# Patient Record
Sex: FEMALE | Race: WHITE | NOT HISPANIC OR LATINO | Employment: OTHER | URBAN - METROPOLITAN AREA
[De-identification: names, ages, dates, MRNs, and addresses within clinical notes are randomized per-mention and may not be internally consistent; named-entity substitution may affect disease eponyms.]

---

## 2019-05-15 ENCOUNTER — OFFICE VISIT (OUTPATIENT)
Dept: URGENT CARE | Facility: CLINIC | Age: 49
End: 2019-05-15
Payer: MEDICARE

## 2019-05-15 VITALS
HEIGHT: 64 IN | RESPIRATION RATE: 18 BRPM | HEART RATE: 85 BPM | OXYGEN SATURATION: 97 % | DIASTOLIC BLOOD PRESSURE: 60 MMHG | SYSTOLIC BLOOD PRESSURE: 109 MMHG | BODY MASS INDEX: 22.88 KG/M2 | TEMPERATURE: 98.1 F | WEIGHT: 134 LBS

## 2019-05-15 DIAGNOSIS — J01.90 ACUTE SINUSITIS, RECURRENCE NOT SPECIFIED, UNSPECIFIED LOCATION: Primary | ICD-10-CM

## 2019-05-15 PROCEDURE — G0463 HOSPITAL OUTPT CLINIC VISIT: HCPCS | Performed by: PHYSICIAN ASSISTANT

## 2019-05-15 PROCEDURE — 99213 OFFICE O/P EST LOW 20 MIN: CPT | Performed by: PHYSICIAN ASSISTANT

## 2019-05-15 RX ORDER — METHYLPREDNISOLONE 4 MG/1
TABLET ORAL
Qty: 1 EACH | Refills: 0 | Status: ON HOLD | OUTPATIENT
Start: 2019-05-15 | End: 2021-05-28 | Stop reason: ALTCHOICE

## 2019-05-15 RX ORDER — OXYMETAZOLINE HYDROCHLORIDE 0.05 G/100ML
2 SPRAY NASAL 2 TIMES DAILY
Qty: 30 ML | Refills: 0 | Status: SHIPPED | OUTPATIENT
Start: 2019-05-15 | End: 2019-05-18

## 2021-05-28 ENCOUNTER — HOSPITAL ENCOUNTER (OUTPATIENT)
Facility: HOSPITAL | Age: 51
Setting detail: OBSERVATION
Discharge: HOME | End: 2021-05-28
Attending: EMERGENCY MEDICINE | Admitting: FAMILY MEDICINE
Payer: MEDICARE

## 2021-05-28 ENCOUNTER — APPOINTMENT (EMERGENCY)
Dept: RADIOLOGY | Facility: HOSPITAL | Age: 51
End: 2021-05-28
Payer: MEDICARE

## 2021-05-28 VITALS
OXYGEN SATURATION: 98 % | HEART RATE: 65 BPM | BODY MASS INDEX: 22.58 KG/M2 | RESPIRATION RATE: 18 BRPM | HEIGHT: 64 IN | DIASTOLIC BLOOD PRESSURE: 56 MMHG | TEMPERATURE: 97.6 F | WEIGHT: 132.28 LBS | SYSTOLIC BLOOD PRESSURE: 116 MMHG

## 2021-05-28 DIAGNOSIS — R07.9 CHEST PAIN: Primary | ICD-10-CM

## 2021-05-28 PROBLEM — H40.9 GLAUCOMA: Status: ACTIVE | Noted: 2021-05-28

## 2021-05-28 PROBLEM — F17.200 SMOKER: Status: ACTIVE | Noted: 2021-05-28

## 2021-05-28 PROBLEM — Z85.038 HISTORY OF COLON CANCER: Status: ACTIVE | Noted: 2021-05-28

## 2021-05-28 LAB
ALBUMIN SERPL BCP-MCNC: 4.2 G/DL (ref 3.5–5.7)
ALP SERPL-CCNC: 51 U/L (ref 40–150)
ALT SERPL W P-5'-P-CCNC: 7 U/L (ref 7–52)
ANION GAP SERPL CALCULATED.3IONS-SCNC: 6 MMOL/L (ref 4–13)
AST SERPL W P-5'-P-CCNC: 10 U/L (ref 13–39)
ATRIAL RATE: 64 BPM
ATRIAL RATE: 65 BPM
ATRIAL RATE: 76 BPM
ATRIAL RATE: 79 BPM
BASOPHILS # BLD AUTO: 0.1 THOUSANDS/ΜL (ref 0–0.1)
BASOPHILS NFR BLD AUTO: 1 % (ref 0–2)
BILIRUB SERPL-MCNC: 0.5 MG/DL (ref 0.2–1)
BUN SERPL-MCNC: 12 MG/DL (ref 7–25)
CALCIUM SERPL-MCNC: 8.8 MG/DL (ref 8.6–10.5)
CHLORIDE SERPL-SCNC: 104 MMOL/L (ref 98–107)
CO2 SERPL-SCNC: 26 MMOL/L (ref 21–31)
CREAT SERPL-MCNC: 0.66 MG/DL (ref 0.6–1.2)
D DIMER PPP FEU-MCNC: 0.3 UG/ML FEU
EOSINOPHIL # BLD AUTO: 0.2 THOUSAND/ΜL (ref 0–0.61)
EOSINOPHIL NFR BLD AUTO: 2 % (ref 0–5)
ERYTHROCYTE [DISTWIDTH] IN BLOOD BY AUTOMATED COUNT: 13.2 % (ref 11.5–14.5)
GFR SERPL CREATININE-BSD FRML MDRD: 103 ML/MIN/1.73SQ M
GLUCOSE SERPL-MCNC: 112 MG/DL (ref 65–99)
HCT VFR BLD AUTO: 39.4 % (ref 42–47)
HGB BLD-MCNC: 13.2 G/DL (ref 12–16)
INR PPP: 0.93 (ref 0.84–1.19)
LYMPHOCYTES # BLD AUTO: 1.6 THOUSANDS/ΜL (ref 0.6–4.47)
LYMPHOCYTES NFR BLD AUTO: 22 % (ref 21–51)
MCH RBC QN AUTO: 33.3 PG (ref 26–34)
MCHC RBC AUTO-ENTMCNC: 33.5 G/DL (ref 31–37)
MCV RBC AUTO: 100 FL (ref 81–99)
MONOCYTES # BLD AUTO: 0.4 THOUSAND/ΜL (ref 0.17–1.22)
MONOCYTES NFR BLD AUTO: 6 % (ref 2–12)
NEUTROPHILS # BLD AUTO: 5 THOUSANDS/ΜL (ref 1.4–6.5)
NEUTS SEG NFR BLD AUTO: 69 % (ref 42–75)
P AXIS: 56 DEGREES
P AXIS: 69 DEGREES
P AXIS: 71 DEGREES
P AXIS: 72 DEGREES
PLATELET # BLD AUTO: 304 THOUSANDS/UL (ref 149–390)
PMV BLD AUTO: 7.7 FL (ref 8.6–11.7)
POTASSIUM SERPL-SCNC: 4 MMOL/L (ref 3.5–5.5)
PR INTERVAL: 134 MS
PR INTERVAL: 136 MS
PR INTERVAL: 144 MS
PR INTERVAL: 150 MS
PROT SERPL-MCNC: 6.8 G/DL (ref 6.4–8.9)
PROTHROMBIN TIME: 12.4 SECONDS (ref 11.6–14.5)
QRS AXIS: 58 DEGREES
QRS AXIS: 71 DEGREES
QRS AXIS: 89 DEGREES
QRS AXIS: 89 DEGREES
QRSD INTERVAL: 80 MS
QRSD INTERVAL: 80 MS
QRSD INTERVAL: 82 MS
QRSD INTERVAL: 82 MS
QT INTERVAL: 394 MS
QT INTERVAL: 398 MS
QT INTERVAL: 428 MS
QT INTERVAL: 428 MS
QTC INTERVAL: 441 MS
QTC INTERVAL: 445 MS
QTC INTERVAL: 447 MS
QTC INTERVAL: 451 MS
RBC # BLD AUTO: 3.96 MILLION/UL (ref 3.9–5.2)
SARS-COV-2 RNA RESP QL NAA+PROBE: NEGATIVE
SODIUM SERPL-SCNC: 136 MMOL/L (ref 134–143)
T WAVE AXIS: 50 DEGREES
T WAVE AXIS: 50 DEGREES
T WAVE AXIS: 55 DEGREES
T WAVE AXIS: 58 DEGREES
TROPONIN I SERPL-MCNC: <0.03 NG/ML
VENTRICULAR RATE: 64 BPM
VENTRICULAR RATE: 65 BPM
VENTRICULAR RATE: 76 BPM
VENTRICULAR RATE: 79 BPM
WBC # BLD AUTO: 7.3 THOUSAND/UL (ref 4.8–10.8)

## 2021-05-28 PROCEDURE — 85379 FIBRIN DEGRADATION QUANT: CPT | Performed by: FAMILY MEDICINE

## 2021-05-28 PROCEDURE — 85025 COMPLETE CBC W/AUTO DIFF WBC: CPT | Performed by: EMERGENCY MEDICINE

## 2021-05-28 PROCEDURE — 71045 X-RAY EXAM CHEST 1 VIEW: CPT

## 2021-05-28 PROCEDURE — 80053 COMPREHEN METABOLIC PANEL: CPT | Performed by: EMERGENCY MEDICINE

## 2021-05-28 PROCEDURE — 93005 ELECTROCARDIOGRAM TRACING: CPT

## 2021-05-28 PROCEDURE — U0005 INFEC AGEN DETEC AMPLI PROBE: HCPCS | Performed by: PHYSICIAN ASSISTANT

## 2021-05-28 PROCEDURE — 36415 COLL VENOUS BLD VENIPUNCTURE: CPT | Performed by: EMERGENCY MEDICINE

## 2021-05-28 PROCEDURE — NC001 PR NO CHARGE: Performed by: FAMILY MEDICINE

## 2021-05-28 PROCEDURE — 84484 ASSAY OF TROPONIN QUANT: CPT | Performed by: FAMILY MEDICINE

## 2021-05-28 PROCEDURE — 93010 ELECTROCARDIOGRAM REPORT: CPT | Performed by: INTERNAL MEDICINE

## 2021-05-28 PROCEDURE — 85610 PROTHROMBIN TIME: CPT | Performed by: EMERGENCY MEDICINE

## 2021-05-28 PROCEDURE — 99285 EMERGENCY DEPT VISIT HI MDM: CPT

## 2021-05-28 PROCEDURE — 99285 EMERGENCY DEPT VISIT HI MDM: CPT | Performed by: PHYSICIAN ASSISTANT

## 2021-05-28 PROCEDURE — 84484 ASSAY OF TROPONIN QUANT: CPT | Performed by: EMERGENCY MEDICINE

## 2021-05-28 PROCEDURE — 99220 PR INITIAL OBSERVATION CARE/DAY 70 MINUTES: CPT | Performed by: FAMILY MEDICINE

## 2021-05-28 PROCEDURE — U0003 INFECTIOUS AGENT DETECTION BY NUCLEIC ACID (DNA OR RNA); SEVERE ACUTE RESPIRATORY SYNDROME CORONAVIRUS 2 (SARS-COV-2) (CORONAVIRUS DISEASE [COVID-19]), AMPLIFIED PROBE TECHNIQUE, MAKING USE OF HIGH THROUGHPUT TECHNOLOGIES AS DESCRIBED BY CMS-2020-01-R: HCPCS | Performed by: PHYSICIAN ASSISTANT

## 2021-05-28 RX ORDER — NITROGLYCERIN 0.4 MG/1
0.4 TABLET SUBLINGUAL ONCE
Status: DISCONTINUED | OUTPATIENT
Start: 2021-05-28 | End: 2021-05-28 | Stop reason: HOSPADM

## 2021-05-28 RX ORDER — ASPIRIN 81 MG/1
324 TABLET, CHEWABLE ORAL ONCE
Status: COMPLETED | OUTPATIENT
Start: 2021-05-28 | End: 2021-05-28

## 2021-05-28 RX ORDER — NICOTINE 21 MG/24HR
1 PATCH, TRANSDERMAL 24 HOURS TRANSDERMAL DAILY
Status: DISCONTINUED | OUTPATIENT
Start: 2021-05-28 | End: 2021-05-28 | Stop reason: HOSPADM

## 2021-05-28 RX ADMIN — ASPIRIN 324 MG: 81 TABLET, CHEWABLE ORAL at 09:24

## 2021-05-28 NOTE — PLAN OF CARE
Problem: PAIN - ADULT  Goal: Verbalizes/displays adequate comfort level or baseline comfort level  Description: Interventions:  - Encourage patient to monitor pain and request assistance  - Assess pain using appropriate pain scale  - Administer analgesics based on type and severity of pain and evaluate response  - Implement non-pharmacological measures as appropriate and evaluate response  - Consider cultural and social influences on pain and pain management  - Notify physician/advanced practitioner if interventions unsuccessful or patient reports new pain  Outcome: Progressing     Problem: INFECTION - ADULT  Goal: Absence or prevention of progression during hospitalization  Description: INTERVENTIONS:  - Assess and monitor for signs and symptoms of infection  - Monitor lab/diagnostic results  - Monitor all insertion sites, i e  indwelling lines, tubes, and drains  - Monitor endotracheal if appropriate and nasal secretions for changes in amount and color  - [place] appropriate cooling/warming therapies per order  - Administer medications as ordered  - Instruct and encourage patient and family to use good hand hygiene technique  - Identify and instruct in appropriate isolation precautions for identified infection/condition  Outcome: Progressing  Goal: Absence of fever/infection during neutropenic period  Description: INTERVENTIONS:  - Monitor WBC    Outcome: Progressing     Problem: SAFETY ADULT  Goal: Patient will remain free of falls  Description: INTERVENTIONS:  - Assess patient frequently for physical needs  -  Identify cognitive and physical deficits and behaviors that affect risk of falls    -  [place] fall precautions as indicated by assessment   - Educate patient/family on patient safety including physical limitations  - Instruct patient to call for assistance with activity based on assessment  - Modify environment to reduce risk of injury  - Consider OT/PT consult to assist with strengthening/mobility  Outcome: Progressing  Goal: Maintain or return to baseline ADL function  Description: INTERVENTIONS:  -  Assess patient's ability to carry out ADLs; assess patient's baseline for ADL function and identify physical deficits which impact ability to perform ADLs (bathing, care of mouth/teeth, toileting, grooming, dressing, etc )  - Assess/evaluate cause of self-care deficits   - Assess range of motion  - Assess patient's mobility; develop plan if impaired  - Assess patient's need for assistive devices and provide as appropriate  - Encourage maximum independence but intervene and supervise when necessary  - Involve family in performance of ADLs  - Assess for home care needs following discharge   - Consider OT consult to assist with ADL evaluation and planning for discharge  - Provide patient education as appropriate  Outcome: Progressing  Goal: Maintain or return mobility status to optimal level  Description: INTERVENTIONS:  - Assess patient's baseline mobility status (ambulation, transfers, stairs, etc )    - Identify cognitive and physical deficits and behaviors that affect mobility  - Identify mobility aids required to assist with transfers and/or ambulation (gait belt, sit-to-stand, lift, walker, cane, etc )  - [place] fall precautions as indicated by assessment  - Record patient progress and toleration of activity level on Mobility SBAR; progress patient to next Phase/Stage  - Instruct patient to call for assistance with activity based on assessment  - Consider rehabilitation consult to assist with strengthening/weightbearing, etc   Outcome: Progressing     Problem: DISCHARGE PLANNING  Goal: Discharge to home or other facility with appropriate resources  Description: INTERVENTIONS:  - Identify barriers to discharge w/patient and caregiver  - Arrange for needed discharge resources and transportation as appropriate  - Identify discharge learning needs (meds, wound care, etc )  - Arrange for interpretive services to assist at discharge as needed  - Refer to Case Management Department for coordinating discharge planning if the patient needs post-hospital services based on physician/advanced practitioner order or complex needs related to functional status, cognitive ability, or social support system  Outcome: Progressing     Problem: Knowledge Deficit  Goal: Patient/family/caregiver demonstrates understanding of disease process, treatment plan, medications, and discharge instructions  Description: Complete learning assessment and assess knowledge base    Interventions:  - Provide teaching at level of understanding  - Provide teaching via preferred learning methods  Outcome: Progressing

## 2021-05-28 NOTE — DISCHARGE INSTRUCTIONS
Cigarette Smoking and Your Health   AMBULATORY CARE:   Risks to your health if you smoke:  Nicotine and other chemicals found in tobacco and e-cigarettes can damage every cell in your body  Even if you are a light smoker, you have an increased risk for cancer, heart disease, and lung disease  If you are pregnant or have diabetes, smoking increases your risk for complications  Nicotine can affect an adolescent's developing brain  This can lead to trouble thinking, learning, or paying attention  Benefits to your health if you stop smoking:   · You decrease respiratory symptoms such as coughing, wheezing, and shortness of breath  · You reduce your risk for cancers of the lung, mouth, throat, kidney, bladder, pancreas, stomach, and cervix  If you already have cancer, you increase the benefits of chemotherapy  You also reduce your risk for cancer returning or a second cancer from developing  · You reduce your risk for heart disease, blood clots, heart attack, and stroke  · You reduce your risk for lung infections, and diseases such as pneumonia, asthma, chronic bronchitis, and emphysema  · Your circulation improves  More oxygen can be delivered to your body  If you have diabetes, you lower your risk for complications, such as kidney, artery, and eye diseases  You also lower your risk for nerve damage  Nerve damage can lead to amputations, poor vision, and blindness  · You improve your body's ability to heal and to fight infections  · An adolescent can help his or her brain and body develop in a healthy way  Talk to your adolescent about all the health risks of nicotine  If you can, start talking about nicotine when your child is younger than 12 years  This may make it easier for him or her not to start using nicotine as a teenager or adult  Explain to him or her that it is best never to start  It can be hard to try to quit later      Benefits to the health of others if you stop smoking:  Tobacco is harmful to nonsmokers who breathe in your secondhand smoke  The following are ways the health of others around you may improve when you stop smoking:  · You lower the risks for lung cancer and heart disease in nonsmoking adults  · If you are pregnant, you lower the risk for miscarriage, early delivery, low birth weight, and stillbirth  You also lower your baby's risk for SIDS, obesity, developmental delay, and neurobehavioral problems, such as ADHD  · If you have children, you lower their risk for ear infections, colds, pneumonia, bronchitis, and asthma  Follow up with your doctor as directed:  Write down your questions so you remember to ask them during your visits  For more information and support to stop smoking:   · [employer]  Phone: 1- 250 - 414-2458  Web Address: [employer]  [place] [address] Information is for End User's use only and may not be sold, redistributed or otherwise used for commercial purposes  All illustrations and images included in CareNotes® are the copyrighted property of A D A M , Inc  or [address] [place] [address]  The above information is an  only  It is not intended as medical advice for individual conditions or treatments  Talk to your doctor, nurse or pharmacist before following any medical regimen to see if it is safe and effective for you  How to Stop Smoking, Ambulatory Care   GENERAL INFORMATION:   Why you should stop smoking: You will improve your health and the health of others around you if you stop smoking  Your risk of heart and lung disease, cancer, stroke, heart attack, and vision problems will also decrease  You can benefit from quitting no matter how long you have smoked  Quitting may even prolong your life  Prepare to stop smoking:  Nicotine is a highly addictive drug found in cigarettes  Withdrawal symptoms can happen when you stop smoking and make it hard to quit   These include anxiety, depression, irritability, trouble sleeping, and increased appetite  You increase your chances of success if you prepare to quit  · Set a quit date  This will help confirm your decision to stop smoking  · Tell friends and family that you plan to quit  Explain that you may have withdrawal symptoms when you try to quit  Ask them to support you  They may be able to encourage you and help reduce your stress to make it easier for you to quit  · Expect it to be hard to quit, but know you can do it  Smoking is a daily habit that becomes part of your life  Know the triggers that tempt you to smoke, so you can break this habit  Write down a list of these challenges and have a plan to avoid them  · Remove all tobacco and nicotine products from your home, car, and workplace  Also, remove anything else that will tempt you to smoke, such as lighters, matches, or ramon trays  Tools to help you stop smoking: You may be able to quit on your own, or you may need to try one or more of the following:  · Counseling  from trained caregivers will help teach you skills to quit smoking  They will also teach you to manage your withdrawal symptoms and cravings  You may receive counseling from one counselor, in group therapy, or through phone therapy called a quit line  · Nicotine replacement therapy (NRT)  such as nicotine patches, gum, or lozenges may help reduce your nicotine cravings and other withdrawal symptoms  You may get these without a doctor's order  · Prescription medicines  such as nasal sprays or nicotine inhalers may help reduce your withdrawal symptoms  Other medicines may also be used to reduce your urge to smoke  Ask your primary healthcare provider about these medicines  You may need to start certain medicines 2 weeks before your quit date for them to work well  Manage your cravings:   · Avoid situations, people, and places that tempt you to smoke  Go to nonsmoking places, such as libraries or restaurants   Understand what tempts you and try to avoid these things  · Keep your hands busy  Hold things such as a stress ball or pen  Keep lollipops, gum, or toothpicks in your mouth to distract you from your cravings  · Avoid alcohol and caffeine  These drinks may tempt you to smoke  Drink healthy liquids such as water or juice instead  · Reward yourself when you resist your cravings  Rewards will motivate you and help you stay positive  For more support and information:   · [employer]  Phone: 4- 299 - 358-8971  Web Address: www smokefree  [employer]  CARE AGREEMENT:   You have the right to help plan your care  Learn about your health condition and how it may be treated  Discuss treatment options with your caregivers to decide what care you want to receive  You always have the right to refuse treatment  The above information is an  only  It is not intended as medical advice for individual conditions or treatments  Talk to your doctor, nurse or pharmacist before following any medical regimen to see if it is safe and effective for you  © 2014 [address] is for End User's use only and may not be sold, redistributed or otherwise used for commercial purposes  All illustrations and images included in CareNotes® are the copyrighted property of [employer]  or [employer]

## 2021-05-28 NOTE — ASSESSMENT & PLAN NOTE
Will admit for ACS rule out  Initial troponin negative  EKG:  Normal sinus rhythm, nonischemic  Will trend troponin and do serial EKGs  Will place in telemetry  Consider cardiology consult depending on results

## 2021-05-28 NOTE — DISCHARGE SUMMARY
[facility]  Discharge- Angélica Pinzon 1970, 48 y o  female MRN: [id number]  Unit/Bed#: -02 Encounter: [id number]  Primary Care Provider: Flower Shepherd MD   Date and time admitted to hospital: 5/28/2021  9:05 AM    * Chest pain  Assessment & Plan  ACS ruled out  Troponin 3 times negative  EKG nonischemic    Patient can be discharged home  Advised to follow-up with PCP for stress test    History of colon cancer  Assessment & Plan  In remission  Continue to follow-up oncology as outpatient    Glaucoma  Assessment & Plan  Continue [place]    Smoker  Assessment & Plan  Smoking cessation Education provided        Discharging Physician / Practitioner: Bret Mcneil MD  PCP: Flower Shepherd MD  Admission Date:   Admission Orders (From admission, onward)     Ordered        05/28/21 1024  Place in Observation  Once                   Discharge Date: 05/28/21        Consultations During Hospital Stay:  · None    Procedures Performed:   · None    Significant Findings / Test Results:   · None    Incidental Findings:   · None    Test Results Pending at Discharge (will require follow up): · None     Outpatient Tests Requested:  · Advised to follow-up with PCP for stress test    Complications:  None    Reason for Admission:  Chest pain  See HPI for details  Hospital Course:     Angélica Pinzon is a 48 y o  female patient who originally presented to the hospital on 5/28/2021 due to chest pain  Telemetry unremarkable  Troponin 3 times negative  EKG nonischemic   ACS ruled out  Patient is stable to be discharged home today  She was advised to follow-up with PCP for stress test      Please see above list of diagnoses and related plan for additional information  Condition at Discharge: good     Discharge Day Visit / Exam:     Subjective:  Patient seen examined bedside  No acute events    No chest pain  Vitals: Blood Pressure: 116/56 (05/28/21 1500)  Pulse: 65 (05/28/21 1500)  Temperature: 97 6 °F (36 4 °C) (05/28/21 1500)  Temp Source: Temporal (05/28/21 1500)  Respirations: 18 (05/28/21 1500)  Height: 5' 4" (162 6 cm) (05/28/21 1130)  Weight - Scale: 60 kg (132 lb 4 4 oz) (05/28/21 1130)  SpO2: 98 % (05/28/21 1500)  Exam:   Physical Exam  Vitals signs and nursing note reviewed  Constitutional:       General: She is not in acute distress  HENT:      Head: Normocephalic  Nose: Nose normal       Mouth/Throat:      Pharynx: Oropharynx is clear  Eyes:      Conjunctiva/sclera: Conjunctivae normal    Cardiovascular:      Rate and Rhythm: Normal rate and regular rhythm  Heart sounds: No murmur  Pulmonary:      Effort: Pulmonary effort is normal  No respiratory distress  Breath sounds: Normal breath sounds  No wheezing  Abdominal:      General: There is no distension  Tenderness: There is no abdominal tenderness  There is no guarding  Musculoskeletal: Normal range of motion  General: No swelling  Right lower leg: No edema  Skin:     General: Skin is warm  Capillary Refill: Capillary refill takes less than 2 seconds  Neurological:      General: No focal deficit present  Mental Status: She is alert and oriented to person, place, and time  Cranial Nerves: No cranial nerve deficit  Psychiatric:         Mood and Affect: Mood normal          Discussion with Family:  With patient    Discharge instructions/Information to patient and family:   See after visit summary for information provided to patient and family  Provisions for Follow-Up Care:  See after visit summary for information related to follow-up care and any pertinent home health orders  Disposition:     Home    For Discharges to [facility] SNF:   · Not Applicable to this Patient - Not Applicable to this Patient    Planned Readmission:  No     Discharge Statement:  I spent 35 minutes discharging the patient  This time was spent on the day of discharge  I had direct contact with the patient on the day of discharge  Greater than 50% of the total time was spent examining patient, answering all patient questions, arranging and discussing plan of care with patient as well as directly providing post-discharge instructions  Additional time then spent on discharge activities  Discharge Medications:  See after visit summary for reconciled discharge medications provided to patient and family        ** Please Note: This note has been constructed using a voice recognition system **

## 2021-05-28 NOTE — NURSING NOTE
Vs by Harish Cisneros , Discharged, discharge instructions given and verbalizes understanding of same

## 2021-05-28 NOTE — DISCHARGE INSTR - AVS FIRST PAGE
Dear Irasema Alejandre,     It was our pleasure to care for you here at [facility]  It is our hope that we were always able to exceed the expected standards for your care during your stay  You were hospitalized due to chest pain  You were cared for on the medical floor by Rhonda Moran MD with the [facility] who covers for your primary care physician (PCP), Jw Warren MD, while you were hospitalized  If you have any questions or concerns related to this hospitalization, you may contact us at [phone]  For follow up as well as any medication refills, we recommend that you follow up with your primary care physician  A registered nurse will reach out to you by phone within a few days after your discharge to answer any additional questions that you may have after going home  However, at this time we provide for you here, the most important instructions / recommendations at discharge:     · Notable Medication Adjustments -   · None  · Testing Required after Discharge -   · Follow-up with PCP for stress test  · Important follow up information -   · None  · Other Instructions -   · Non  · Please review this entire after visit summary as additional general instructions including medication list, appointments, activity, diet, any pertinent wound care, and other additional recommendations from your care team that may be provided for you        Sincerely,     Rhonda Moran MD

## 2021-05-28 NOTE — ED PROVIDER NOTES
History  Chief Complaint   Patient presents with    Chest Pain     left sided chest pain radiating into left arm  began around 30 minutes ago  occured álvaro she was walking around [facility]     51-year-old female 1 pack per day smoker and recently diagnosed glaucoma presents complaining of chest pain  Patient describes it as a pressure-like sensation with radiation to the left arm that was associated with clamminess, dizziness and generalized weakness with the pain occurred  She reports that the pain has been intermittent since it occurred starting approximately 30 minutes prior to arrival although a slight pain is always present  She reports a significant family history of both her mother and father having multiple heart attacks in their late 45s and early 46s  She denies a history of hypertension, hyperlipidemia or being a diabetic  Denies any pleuritic pain or shortness of breath or any recent lower leg pain or swelling or any other complaints at this time  Has not taken anything for her symptoms and denies any alleviating or exacerbating factors  Prior to Admission Medications   Prescriptions Last Dose Informant Patient Reported? Taking?   bimatoprost (LUMIGAN) 0 01 % ophthalmic drops   Yes Yes   Si drop daily at bedtime   methylPREDNISolone 4 MG tablet therapy pack Not Taking at Unknown time  No No   Sig: Use as directed on package   Patient not taking: Reported on 2021   oxymetazoline (AFRIN) 0 05 % nasal spray   No No   Si sprays by Each Nare route 2 (two) times a day for 3 days      Facility-Administered Medications: None       Past Medical History:   Diagnosis Date    Glaucoma        Past Surgical History:   Procedure Laterality Date    LIVER SURGERY      RIGHT COLON RESECTION         History reviewed  No pertinent family history  I have reviewed and agree with the history as documented      E-Cigarette/Vaping    E-Cigarette Use Never User      E-Cigarette/Vaping Substances Social History     Tobacco Use    Smoking status: Current Every Day Smoker     Packs/day: 1 00    Smokeless tobacco: Never Used   Substance Use Topics    Alcohol use: Not Currently    Drug use: Never       Review of Systems   Constitutional: Positive for diaphoresis  Negative for chills, fatigue and fever  HENT: Negative for ear pain and sore throat  Eyes: Negative for pain  Respiratory: Negative for cough, shortness of breath and wheezing  Cardiovascular: Positive for chest pain  Negative for palpitations and leg swelling  Gastrointestinal: Positive for nausea  Negative for abdominal pain, constipation, diarrhea and vomiting  Endocrine: Negative for polyuria  Genitourinary: Negative for dysuria and pelvic pain  Musculoskeletal: Negative for arthralgias, myalgias, neck pain and neck stiffness  Skin: Negative for rash  Neurological: Negative for dizziness, syncope, light-headedness and headaches  All other systems reviewed and are negative  Physical Exam  Physical Exam  Constitutional:       Appearance: She is well-developed  HENT:      Head: Normocephalic and atraumatic  Mouth/Throat:      Pharynx: No oropharyngeal exudate  Neck:      Musculoskeletal: Normal range of motion  Cardiovascular:      Rate and Rhythm: Normal rate and regular rhythm  Heart sounds: Normal heart sounds  Pulmonary:      Effort: Pulmonary effort is normal       Breath sounds: Normal breath sounds  Abdominal:      General: Bowel sounds are normal       Palpations: Abdomen is soft  Tenderness: There is no abdominal tenderness  Musculoskeletal: Normal range of motion  Skin:     General: Skin is warm  Capillary Refill: Capillary refill takes less than 2 seconds  Neurological:      General: No focal deficit present  Mental Status: She is alert and oriented to person, place, and time           Vital Signs  ED Triage Vitals [05/28/21 0855]   Temperature Pulse Respirations Blood Pressure SpO2   (!) 97 2 °F (36 2 °C) 76 18 113/61 98 %      Temp Source Heart Rate Source Patient Position - Orthostatic VS BP Location FiO2 (%)   Temporal -- -- -- --      Pain Score       5           Vitals:    05/28/21 0855   BP: 113/61   Pulse: 76         Visual Acuity      ED Medications  Medications   nitroglycerin (NITROSTAT) SL tablet 0 4 mg (has no administration in time range)   aspirin chewable tablet 324 mg (324 mg Oral Given 5/28/21 0924)       Diagnostic Studies  Results Reviewed     Procedure Component Value Units Date/Time    Troponin I repeat in 3 hrs [[id number]]     Lab Status: No result Specimen: Blood     Novel Coronavirus (Covid-19),PCR [facility] - 2 Hour Stat [[id number]] Collected: 05/28/21 0944    Lab Status:  In process Specimen: Nasopharyngeal Swab Updated: 05/28/21 0948    Comprehensive metabolic panel [[id number]]  (Abnormal) Collected: 05/28/21 0911    Lab Status: Final result Specimen: Blood from Arm, Left Updated: 05/28/21 0940     Sodium 136 mmol/L      Potassium 4 0 mmol/L      Chloride 104 mmol/L      CO2 26 mmol/L      ANION GAP 6 mmol/L      BUN 12 mg/dL      Creatinine 0 66 mg/dL      Glucose 112 mg/dL      Calcium 8 8 mg/dL      AST 10 U/L      ALT 7 U/L      Alkaline Phosphatase 51 U/L      Total Protein 6 8 g/dL      Albumin 4 2 g/dL      Total Bilirubin 0 50 mg/dL      eGFR 103 ml/min/1 73sq m     Narrative:      Nicho guidelines for Chronic Kidney Disease (CKD):     Stage 1 with normal or high GFR (GFR > 90 mL/min/1 73 square meters)    Stage 2 Mild CKD (GFR = 60-89 mL/min/1 73 square meters)    Stage 3A Moderate CKD (GFR = 45-59 mL/min/1 73 square meters)    Stage 3B Moderate CKD (GFR = 30-44 mL/min/1 73 square meters)    Stage 4 Severe CKD (GFR = 15-29 mL/min/1 73 square meters)    Stage 5 End Stage CKD (GFR <15 mL/min/1 73 square meters)  Note: GFR calculation is accurate only with a steady state creatinine    Troponin I [[id number]] (Normal) Collected: 05/28/21 0911    Lab Status: Final result Specimen: Blood from Arm, Left Updated: 05/28/21 0940     Troponin I <0 03 ng/mL     Protime-INR [[id number]]  (Normal) Collected: 05/28/21 0911    Lab Status: Final result Specimen: Blood from Arm, Left Updated: 05/28/21 0936     Protime 12 4 seconds      INR 0 93    CBC and differential [[id number]]  (Abnormal) Collected: 05/28/21 0911    Lab Status: Final result Specimen: Blood from Arm, Left Updated: 05/28/21 0920     WBC 7 30 Thousand/uL      RBC 3 96 Million/uL      Hemoglobin 13 2 g/dL      Hematocrit 39 4 %       fL      MCH 33 3 pg      MCHC 33 5 g/dL      RDW 13 2 %      MPV 7 7 fL      Platelets 107 Thousands/uL      Neutrophils Relative 69 %      Lymphocytes Relative 22 %      Monocytes Relative 6 %      Eosinophils Relative 2 %      Basophils Relative 1 %      Neutrophils Absolute 5 00 Thousands/µL      Lymphocytes Absolute 1 60 Thousands/µL      Monocytes Absolute 0 40 Thousand/µL      Eosinophils Absolute 0 20 Thousand/µL      Basophils Absolute 0 10 Thousands/µL                  XR chest 1 view portable   Final Result by Becca Pool DO (05/28 1008)      No acute cardiopulmonary disease  Workstation performed: [id number]                    Procedures  ECG 12 Lead Documentation Only    Date/Time: 5/28/2021 9:23 AM  Performed by: Beto Cortes PA-C  Authorized by:  Beto Cortes PA-C     ECG reviewed by me, the ED Provider: yes    Patient location:  ED  Previous ECG:     Previous ECG:  Compared to current    Similarity:  No change    Comparison to cardiac monitor: Yes    Interpretation:     Interpretation: normal    Rate:     ECG rate:  79    ECG rate assessment: normal    Rhythm:     Rhythm: sinus rhythm    Ectopy:     Ectopy: none    QRS:     QRS axis:  Normal  Conduction:     Conduction: normal    ST segments:     ST segments:  Normal  T waves:     T waves: normal    Comments:      No evidence of acute cardiac ischemia             ED Course             HEART Risk Score      Most Recent Value   Heart Score Risk Calculator   History  2 Filed at: 05/28/2021 0919   ECG  0 Filed at: 05/28/2021 0919   Age  1 Filed at: 05/28/2021 0919   Risk Factors  2 Filed at: 05/28/2021 0919   Troponin  0 Filed at: 05/28/2021 0919   HEART Score  5 Filed at: 05/28/2021 0866                                    MDM  Number of Diagnoses or Management Options  Chest pain:   Diagnosis management comments: 77-year-old female presented complaining of chest pain that was concerning for true cardiac chest pain clammy S, weakness and dizziness with radiation to left arm  Initial troponin negative  EKG shows no signs of acute cardiac ischemia however given heart score 5 will admit for chest pain observation  Patient is agreeable to plan  Disposition  Final diagnoses:   Chest pain     Time reflects when diagnosis was documented in both MDM as applicable and the Disposition within this note     Time User Action Codes Description Comment    5/28/2021 10:24 AM Kecia Gaxiola Add [R07 9] Chest pain       ED Disposition     ED Disposition Condition Date/Time Comment    Admit Stable Fri May 28, 2021 10:24 AM Case was discussed with Dr Zuleima Blanchard and the patient's admission status was agreed to be Admission Status: observation status to the service of Dr Zuleima Blanchard  Follow-up Information    None         Patient's Medications   Discharge Prescriptions    No medications on file     No discharge procedures on file      PDMP Review     None          ED Provider  Electronically Signed by           Rusty Griffiths PA-C  05/28/21 0468

## 2021-05-28 NOTE — H&P
[facility]  H&P- Yuriy Valdez 1970, 48 y o  female MRN: [id number]  Unit/Bed#: -02 Encounter: [id number]  Primary Care Provider: Mal Harding MD   Date and time admitted to hospital: 5/28/2021  9:05 AM    * Chest pain  Assessment & Plan  Will admit for ACS rule out  Initial troponin negative  EKG:  Normal sinus rhythm, nonischemic  Will trend troponin and do serial EKGs  Will place in telemetry  Consider cardiology consult depending on results      History of colon cancer  Assessment & Plan  In remission  Continue to follow-up oncology as outpatient    Glaucoma  Assessment & Plan  Continue Lumigan    Smoker  Assessment & Plan  Will provide with nicotine patch  Smoking cessation Education prior discharge    VTE Prophylaxis: Patient ambulatory  / sequential compression device   Code Status:  Full code  POLST: POLST form is not discussed and not completed at this time  Discussion with family:  With patient    Anticipated Length of Stay:  Patient will be admitted on an Observation basis with an anticipated length of stay of  less than 2 midnights  Justification for Hospital Stay:  ACS rule out    Total Time for Visit, including Counseling / Coordination of Care: 45 minutes  Greater than 50% of this total time spent on direct patient counseling and coordination of care  Chief Complaint:   Chest pain    History of Present Illness:    Yuriy Valdez is a 48 y o  female with past medical history significant for colon cancer,  now in remission who presents with chest pain  Patient describes sudden onset substernal, 5/5 chest pain that radiates across the chest to the left arm  It is associated with dizziness and shortness of breath  Nothing makes it better or worse     Patient never had pain like that before  Her father had triple bypass at the age of 55      She continues to smoke 1 pack of cigarettes daily  Review of Systems:    Review of Systems   Constitutional: Negative for chills, fever and unexpected weight change  HENT: Negative for hearing loss, nosebleeds and sore throat  Eyes: Negative for pain, redness and visual disturbance  Respiratory: Positive for shortness of breath  Negative for cough and wheezing  Cardiovascular: Positive for chest pain  Negative for palpitations and leg swelling  Gastrointestinal: Negative for abdominal pain, nausea and vomiting  Endocrine: Negative for polydipsia and polyuria  Genitourinary: Negative for dysuria and hematuria  Musculoskeletal: Negative for arthralgias, joint swelling and myalgias  Skin: Negative for rash and wound  Neurological: Positive for dizziness  Negative for numbness and headaches  Psychiatric/Behavioral: Negative for decreased concentration and suicidal ideas  The patient is not nervous/anxious  Past Medical and Surgical History:     Past Medical History:   Diagnosis Date    Glaucoma        Past Surgical History:   Procedure Laterality Date    LIVER SURGERY      RIGHT COLON RESECTION         Meds/Allergies:    Prior to Admission medications    Medication Sig Start Date End Date Taking? Authorizing Provider   bimatoprost (LUMIGAN) 0 01 % ophthalmic drops 1 drop daily at bedtime   Yes Historical Provider, MD   methylPREDNISolone 4 MG tablet therapy pack Use as directed on package  Patient not taking: Reported on 5/28/2021 5/15/19   Omayra Aquino PA-C   oxymetazoline (AFRIN) 0 05 % nasal spray 2 sprays by Each Nare route 2 (two) times a day for 3 days 5/15/19 5/18/19  Marylee Dotter, PA-C     I have reviewed home medications with patient personally  Allergies:    Allergies   Allergen Reactions    Clarithromycin      Other reaction(s): GI Upset    Iodinated Diagnostic Agents     Morphine      Other reaction(s): GI Upset       Social History:     Marital Status: /Civil Union   Occupation:   Patient Pre-hospital Living Situation:  Living with   Patient Pre-hospital Level of Mobility:  Normal  Patient Pre-hospital Diet Restrictions:  None  Substance Use History:   Social History     Substance and Sexual Activity   Alcohol Use Not Currently     Social History     Tobacco Use   Smoking Status Current Every Day Smoker    Packs/day: 1 00   Smokeless Tobacco Never Used     Social History     Substance and Sexual Activity   Drug Use Never       Family History:    non-contributory    Physical Exam:     Vitals:   Blood Pressure: 95/54 (05/28/21 1100)  Pulse: 59 (05/28/21 1100)  Temperature: (!) 97 2 °F (36 2 °C) (05/28/21 0855)  Temp Source: Temporal (05/28/21 0855)  Respirations: 17 (05/28/21 1100)  Height: 5' 4" (162 6 cm) (05/28/21 0855)  Weight - Scale: 59 kg (130 lb) (05/28/21 0855)  SpO2: 98 % (05/28/21 1100)    Physical Exam        Additional Data:     Lab Results: I have personally reviewed pertinent reports  Results from last 7 days   Lab Units 05/28/21  0911   WBC Thousand/uL 7 30   HEMOGLOBIN g/dL 13 2   HEMATOCRIT % 39 4*   PLATELETS Thousands/uL 304   NEUTROS PCT % 69   LYMPHS PCT % 22   MONOS PCT % 6   EOS PCT % 2     Results from last 7 days   Lab Units 05/28/21  0911   SODIUM mmol/L 136   POTASSIUM mmol/L 4 0   CHLORIDE mmol/L 104   CO2 mmol/L 26   BUN mg/dL 12   CREATININE mg/dL 0 66   ANION GAP mmol/L 6   CALCIUM mg/dL 8 8   ALBUMIN g/dL 4 2   TOTAL BILIRUBIN mg/dL 0 50   ALK PHOS U/L 51   ALT U/L 7   AST U/L 10*   GLUCOSE RANDOM mg/dL 112*     Results from last 7 days   Lab Units 05/28/21  0911   INR  0 93                   Imaging: I have personally reviewed pertinent reports  XR chest 1 view portable   Final Result by Reginaldo Wade DO (05/28 1008)      No acute cardiopulmonary disease                    Workstation performed: [id number]             EKG, Pathology, and Other Studies Reviewed on Admission:   · EKG:  Sinus rhythm, nonischemic    Allscripts / Epic Records Reviewed: Yes     ** Please Note: This note has been constructed using a voice recognition system   **

## 2021-05-28 NOTE — ASSESSMENT & PLAN NOTE
ACS ruled out  Troponin 3 times negative  EKG nonischemic    Patient can be discharged home    Advised to follow-up with PCP for stress test

## 2023-02-24 ENCOUNTER — APPOINTMENT (EMERGENCY)
Dept: CT IMAGING | Facility: HOSPITAL | Age: 53
End: 2023-02-24

## 2023-02-24 ENCOUNTER — HOSPITAL ENCOUNTER (EMERGENCY)
Facility: HOSPITAL | Age: 53
Discharge: HOME | End: 2023-02-24
Attending: EMERGENCY MEDICINE

## 2023-02-24 VITALS
OXYGEN SATURATION: 98 % | DIASTOLIC BLOOD PRESSURE: 71 MMHG | TEMPERATURE: 98.1 F | RESPIRATION RATE: 18 BRPM | HEART RATE: 75 BPM | SYSTOLIC BLOOD PRESSURE: 141 MMHG

## 2023-02-24 DIAGNOSIS — R20.2 PARESTHESIAS: Primary | ICD-10-CM

## 2023-02-24 DIAGNOSIS — R42 POSTURAL DIZZINESS WITH PRESYNCOPE: ICD-10-CM

## 2023-02-24 DIAGNOSIS — R55 POSTURAL DIZZINESS WITH PRESYNCOPE: ICD-10-CM

## 2023-02-24 LAB
ALBUMIN SERPL BCP-MCNC: 4.2 G/DL (ref 3.5–5)
ALP SERPL-CCNC: 61 U/L (ref 34–104)
ALT SERPL W P-5'-P-CCNC: 14 U/L (ref 7–52)
ANION GAP SERPL CALCULATED.3IONS-SCNC: 7 MMOL/L (ref 4–13)
AST SERPL W P-5'-P-CCNC: 18 U/L (ref 13–39)
BASOPHILS # BLD AUTO: 0.06 THOUSANDS/ÂΜL (ref 0–0.1)
BASOPHILS NFR BLD AUTO: 1 % (ref 0–1)
BILIRUB SERPL-MCNC: 0.54 MG/DL (ref 0.2–1)
BUN SERPL-MCNC: 16 MG/DL (ref 5–25)
CALCIUM SERPL-MCNC: 9.6 MG/DL (ref 8.4–10.2)
CARDIAC TROPONIN I PNL SERPL HS: <2 NG/L
CHLORIDE SERPL-SCNC: 105 MMOL/L (ref 96–108)
CO2 SERPL-SCNC: 25 MMOL/L (ref 21–32)
CREAT SERPL-MCNC: 0.7 MG/DL (ref 0.6–1.3)
EOSINOPHIL # BLD AUTO: 0.39 THOUSAND/ÂΜL (ref 0–0.61)
EOSINOPHIL NFR BLD AUTO: 4 % (ref 0–6)
ERYTHROCYTE [DISTWIDTH] IN BLOOD BY AUTOMATED COUNT: 12.4 % (ref 11.6–15.1)
GFR SERPL CREATININE-BSD FRML MDRD: 99 ML/MIN/1.73SQ M
GLUCOSE SERPL-MCNC: 83 MG/DL (ref 65–140)
HCT VFR BLD AUTO: 38.6 % (ref 34.8–46.1)
HGB BLD-MCNC: 13.2 G/DL (ref 11.5–15.4)
IMM GRANULOCYTES # BLD AUTO: 0.03 THOUSAND/UL (ref 0–0.2)
IMM GRANULOCYTES NFR BLD AUTO: 0 % (ref 0–2)
LYMPHOCYTES # BLD AUTO: 2.62 THOUSANDS/ÂΜL (ref 0.6–4.47)
LYMPHOCYTES NFR BLD AUTO: 23 % (ref 14–44)
MCH RBC QN AUTO: 34.8 PG (ref 26.8–34.3)
MCHC RBC AUTO-ENTMCNC: 34.2 G/DL (ref 31.4–37.4)
MCV RBC AUTO: 102 FL (ref 82–98)
MONOCYTES # BLD AUTO: 0.73 THOUSAND/ÂΜL (ref 0.17–1.22)
MONOCYTES NFR BLD AUTO: 7 % (ref 4–12)
NEUTROPHILS # BLD AUTO: 7.45 THOUSANDS/ÂΜL (ref 1.85–7.62)
NEUTS SEG NFR BLD AUTO: 65 % (ref 43–75)
NRBC BLD AUTO-RTO: 0 /100 WBCS
PLATELET # BLD AUTO: 294 THOUSANDS/UL (ref 149–390)
PMV BLD AUTO: 9.4 FL (ref 8.9–12.7)
POTASSIUM SERPL-SCNC: 4.7 MMOL/L (ref 3.5–5.3)
PROT SERPL-MCNC: 6.7 G/DL (ref 6.4–8.4)
RBC # BLD AUTO: 3.79 MILLION/UL (ref 3.81–5.12)
SODIUM SERPL-SCNC: 137 MMOL/L (ref 135–147)
TSH SERPL DL<=0.05 MIU/L-ACNC: 3.83 UIU/ML (ref 0.45–4.5)
WBC # BLD AUTO: 11.28 THOUSAND/UL (ref 4.31–10.16)

## 2023-02-24 RX ADMIN — SODIUM CHLORIDE 1000 ML: 0.9 INJECTION, SOLUTION INTRAVENOUS at 18:42

## 2023-02-24 NOTE — ED NOTES
Patient transported to [facility] 809, [address] [facility]  02/24/23 9649

## 2023-02-25 LAB
ATRIAL RATE: 60 BPM
P AXIS: 61 DEGREES
PR INTERVAL: 160 MS
QRS AXIS: 88 DEGREES
QRSD INTERVAL: 80 MS
QT INTERVAL: 438 MS
QTC INTERVAL: 438 MS
T WAVE AXIS: 64 DEGREES
VENTRICULAR RATE: 60 BPM

## 2023-02-25 NOTE — ED PROVIDER NOTES
History  Chief Complaint   Patient presents with   • Tingling     Patient reports tingling in multiple areas of her body for the past 6 days  Patient reports it goes on and off  Patient also reports headache  Patient reports sinus infection that she has had since September  This patient complains of persistent fluctuating headache for the past couple weeks which she has been seeing ENT has tried multiple medications for sinusitis  She reports that today she also had paresthesias in all of her limbs and some presyncope  She states she has a history of panic attacks but thinks that this is different  States she is not having any focal weakness or numbness, just a diffuse paresthesia with some global presyncope and weakness  Not having any chest pain, no palpitations, no cough, no syncope  She reports urination and bowel and admits have been normal           Prior to Admission Medications   Prescriptions Last Dose Informant Patient Reported? Taking?   bimatoprost (LUMIGAN) 0 01 % ophthalmic drops   Yes No   Si drop daily at bedtime   oxymetazoline (AFRIN) 0 05 % nasal spray   No No   Si sprays by Each Nare route 2 (two) times a day for 3 days      Facility-Administered Medications: None       Past Medical History:   Diagnosis Date   • Glaucoma        Past Surgical History:   Procedure Laterality Date   • LIVER SURGERY     • RIGHT COLON RESECTION         History reviewed  No pertinent family history  I have reviewed and agree with the history as documented  E-Cigarette/Vaping   • E-Cigarette Use Never User      E-Cigarette/Vaping Substances     Social History     Tobacco Use   • Smoking status: Every Day     Packs/day: 1 00     Types: Cigarettes   • Smokeless tobacco: Never   Vaping Use   • Vaping Use: Never used   Substance Use Topics   • Alcohol use: Not Currently   • Drug use: Never       Review of Systems   Constitutional: Negative for activity change, chills, fatigue and fever     HENT: Positive for congestion and sore throat  Eyes: Negative for visual disturbance  Respiratory: Negative for cough, chest tightness and shortness of breath  Cardiovascular: Negative for chest pain  Gastrointestinal: Negative for abdominal pain, diarrhea and vomiting  Genitourinary: Negative for dysuria  Skin: Negative for rash  Physical Exam  Physical Exam  Constitutional:       General: She is not in acute distress  Appearance: She is well-developed  She is not ill-appearing, toxic-appearing or diaphoretic  HENT:      Head: Normocephalic and atraumatic  Eyes:      Conjunctiva/sclera: Conjunctivae normal       Pupils: Pupils are equal, round, and reactive to light  Cardiovascular:      Rate and Rhythm: Normal rate and regular rhythm  Heart sounds: Normal heart sounds  Pulmonary:      Effort: Pulmonary effort is normal  No respiratory distress  Breath sounds: Normal breath sounds  Abdominal:      General: Bowel sounds are normal       Palpations: Abdomen is soft  Musculoskeletal:         General: Normal range of motion  Cervical back: Normal range of motion and neck supple  Skin:     General: Skin is warm and dry  Capillary Refill: Capillary refill takes less than 2 seconds  Neurological:      General: No focal deficit present  Mental Status: She is alert and oriented to person, place, and time  Cranial Nerves: No cranial nerve deficit  Sensory: No sensory deficit  Motor: No weakness        Coordination: Coordination normal       Gait: Gait normal    Psychiatric:         Behavior: Behavior normal          Vital Signs  ED Triage Vitals [02/24/23 1832]   Temperature Pulse Respirations Blood Pressure SpO2   98 1 °F (36 7 °C) 68 18 141/71 98 %      Temp Source Heart Rate Source Patient Position - Orthostatic VS BP Location FiO2 (%)   Tympanic -- Lying Left arm --      Pain Score       --           Vitals:    02/24/23 1832 02/24/23 2015   BP: 141/71    Pulse: 68 75   Patient Position - Orthostatic VS: Lying          Visual Acuity  Visual Acuity    Flowsheet Row Most Recent Value   L Pupil Size (mm) 3   R Pupil Size (mm) 3          ED Medications  Medications   sodium chloride 0 9 % bolus 1,000 mL (0 mL Intravenous Stopped 2/24/23 1912)       Diagnostic Studies  Results Reviewed     Procedure Component Value Units Date/Time    HS Troponin 0hr (reflex protocol) [[id number]]  (Normal) Collected: 02/24/23 1923    Lab Status: Final result Specimen: Blood from Arm, Left Updated: 02/24/23 1952     hs TnI 0hr <2 ng/L     TSH [[id number]]  (Normal) Collected: 02/24/23 1841    Lab Status: Final result Specimen: Blood from Arm, Left Updated: 02/24/23 1925     TSH 3RD GENERATON 3 826 uIU/mL     Narrative:      Patients undergoing fluorescein dye angiography may retain small amounts of fluorescein in the body for 48-72 hours post procedure  Samples containing fluorescein can produce falsely depressed TSH values  If the patient had this procedure,a specimen should be resubmitted post fluorescein clearance        Comprehensive metabolic panel [[id number]] Collected: 02/24/23 1841    Lab Status: Final result Specimen: Blood from Arm, Left Updated: 02/24/23 1912     Sodium 137 mmol/L      Potassium 4 7 mmol/L      Chloride 105 mmol/L      CO2 25 mmol/L      ANION GAP 7 mmol/L      BUN 16 mg/dL      Creatinine 0 70 mg/dL      Glucose 83 mg/dL      Calcium 9 6 mg/dL      AST 18 U/L      ALT 14 U/L      Alkaline Phosphatase 61 U/L      Total Protein 6 7 g/dL      Albumin 4 2 g/dL      Total Bilirubin 0 54 mg/dL      eGFR 99 ml/min/1 73sq m     Narrative:      Nicho guidelines for Chronic Kidney Disease (CKD):   •  Stage 1 with normal or high GFR (GFR > 90 mL/min/1 73 square meters)  •  Stage 2 Mild CKD (GFR = 60-89 mL/min/1 73 square meters)  •  Stage 3A Moderate CKD (GFR = 45-59 mL/min/1 73 square meters)  •  Stage 3B Moderate CKD (GFR = 30-44 mL/min/1 73 square meters)  •  Stage 4 Severe CKD (GFR = 15-29 mL/min/1 73 square meters)  •  Stage 5 End Stage CKD (GFR <15 mL/min/1 73 square meters)  Note: GFR calculation is accurate only with a steady state creatinine    CBC and differential [[id number]]  (Abnormal) Collected: 02/24/23 1841    Lab Status: Final result Specimen: Blood from Arm, Left Updated: 02/24/23 1848     WBC 11 28 Thousand/uL      RBC 3 79 Million/uL      Hemoglobin 13 2 g/dL      Hematocrit 38 6 %       fL      MCH 34 8 pg      MCHC 34 2 g/dL      RDW 12 4 %      MPV 9 4 fL      Platelets 609 Thousands/uL      nRBC 0 /100 WBCs      Neutrophils Relative 65 %      Immat GRANS % 0 %      Lymphocytes Relative 23 %      Monocytes Relative 7 %      Eosinophils Relative 4 %      Basophils Relative 1 %      Neutrophils Absolute 7 45 Thousands/µL      Immature Grans Absolute 0 03 Thousand/uL      Lymphocytes Absolute 2 62 Thousands/µL      Monocytes Absolute 0 73 Thousand/µL      Eosinophils Absolute 0 39 Thousand/µL      Basophils Absolute 0 06 Thousands/µL                  CT head without contrast   Final Result by Yobani Norwood MD (02/24 1927)      No acute intracranial abnormality  Workstation performed: [id number]                    Procedures  Procedures         ED Course                               SBIRT 20yo+    Flowsheet Row Most Recent Value   SBIRT (25 yo +)    In order to provide better care to our patients, we are screening all of our patients for alcohol and drug use  Would it be okay to ask you these screening questions? Unable to answer at this time Filed at: 02/24/2023 1846                    Medical Decision Making  Is a 43-year-old female with presyncope and paresthesias  These have resolved by the time she arrived here  She has no neurological deficits  She appears clinically well  CT scan demonstrates no acute findings  Reassured by chronicity of patient's headaches and symptoms    Patient following up with ENT within the next week  Troponin less than 2  Normal sinus rhythm on EKG  Discussed warning signs and symptoms with the patient as well as when to return to the emergency department versus follow up with [facility]  Patient states understanding and agreement with the plan  Patient care delayed due to critical capacity in the emergency department  This note was completed using dictation software  Paresthesias: complicated acute illness or injury  Postural dizziness with presyncope: complicated acute illness or injury  Amount and/or Complexity of Data Reviewed  Labs: ordered  Radiology: ordered  Disposition  Final diagnoses:   Paresthesias   Postural dizziness with presyncope     Time reflects when diagnosis was documented in both MDM as applicable and the Disposition within this note     Time User Action Codes Description Comment    2/24/2023  8:07 PM Billy Correa [R20 2] Paresthesias     2/24/2023  8:07 PM Arely Cabrera [W11,  R55] Postural dizziness with presyncope       ED Disposition     ED Disposition   Discharge    Condition   Stable    Date/Time   Fri Feb 24, 2023  8:07 PM    [address] discharge to home/self care  Follow-up Information     Follow up With Specialties Details Why Contact Info    Hoa Jason MD Family Medicine In 1 day  [facility]  [phone]            Discharge Medication List as of 2/24/2023  8:08 PM      CONTINUE these medications which have NOT CHANGED    Details   bimatoprost (LUMIGAN) 0 01 % ophthalmic drops 1 drop daily at bedtime, Historical Med      oxymetazoline (AFRIN) 0 05 % nasal spray 2 sprays by Each Nare route 2 (two) times a day for 3 days, Starting Wed 5/15/2019, Until Sat 5/18/2019, Normal             No discharge procedures on file      PDMP Review     None          ED Provider  Electronically Signed by           Geneva Chung MD  02/24/23 7480

## 2023-08-02 ENCOUNTER — OFFICE VISIT (OUTPATIENT)
Dept: URGENT CARE | Facility: CLINIC | Age: 53
End: 2023-08-02
Payer: COMMERCIAL

## 2023-08-02 VITALS
WEIGHT: 131 LBS | DIASTOLIC BLOOD PRESSURE: 59 MMHG | HEART RATE: 90 BPM | TEMPERATURE: 98.6 F | BODY MASS INDEX: 22.36 KG/M2 | OXYGEN SATURATION: 100 % | SYSTOLIC BLOOD PRESSURE: 122 MMHG | RESPIRATION RATE: 18 BRPM | HEIGHT: 64 IN

## 2023-08-02 DIAGNOSIS — S29.012A MUSCLE STRAIN OF RIGHT UPPER BACK, INITIAL ENCOUNTER: Primary | ICD-10-CM

## 2023-08-02 PROCEDURE — 99283 EMERGENCY DEPT VISIT LOW MDM: CPT

## 2023-08-02 PROCEDURE — G0382 LEV 3 HOSP TYPE B ED VISIT: HCPCS

## 2023-08-02 RX ORDER — CYCLOBENZAPRINE HCL 5 MG
5 TABLET ORAL 3 TIMES DAILY PRN
Qty: 30 TABLET | Refills: 0 | Status: SHIPPED | OUTPATIENT
Start: 2023-08-02

## 2023-08-02 RX ORDER — ERGOCALCIFEROL 1.25 MG/1
1 CAPSULE ORAL WEEKLY
COMMUNITY
Start: 2023-06-25 | End: 2023-09-17

## 2023-08-02 RX ORDER — ROSUVASTATIN CALCIUM 10 MG/1
TABLET, COATED ORAL
COMMUNITY
Start: 2023-07-15

## 2023-08-02 RX ORDER — MONTELUKAST SODIUM 10 MG/1
10 TABLET ORAL
COMMUNITY
Start: 2022-11-08 | End: 2023-11-08

## 2023-08-02 NOTE — PATIENT INSTRUCTIONS
1) Ice to affected area first 48 hours, heat afterwards. 15 minutes on and 45 minutes off as needed throughout the day  2) Topical pain medication such as icy/hot, Biofreeze, Salon pas, etc.  3) Ibuprofen - 400-800 mg orally every 6-8 hours when required, maximum 2400 mg/day OR Naproxen - 250-500 mg orally twice daily when required, maximum 1250 mg/day    4) Acetaminophen - 325-1000 mg orally every 4-6 hours when required, maximum 4000 mg/day  5) Flexeril sent to pharmacy. Can take along with naproxen. 6) Stretching exercises    Follow up with PCP in 3-5 days. Proceed to ER if symptoms worsen.

## 2023-08-02 NOTE — PROGRESS NOTES
[facility]        NAME: Leonor Mei is a 46 y.o. female  : 1970    MRN: [id number]  DATE: 2023  TIME: 10:45 AM    Assessment and Plan   Muscle strain of right upper back, initial encounter [S29.012A]  1. Muscle strain of right upper back, initial encounter  cyclobenzaprine (FLEXERIL) 5 mg tablet            Patient Instructions     1) Ice to affected area first 48 hours, heat afterwards. 15 minutes on and 45 minutes off as needed throughout the day  2) Topical pain medication such as icy/hot, Biofreeze, Salon pas, etc.  3) Ibuprofen - 400-800 mg orally every 6-8 hours when required, maximum 2400 mg/day OR Naproxen - 250-500 mg orally twice daily when required, maximum 1250 mg/day    4) Acetaminophen - 325-1000 mg orally every 4-6 hours when required, maximum 4000 mg/day  5) Flexeril sent to pharmacy. Can take along with naproxen. 6) Stretching exercises    Follow up with PCP in 3-5 days. Proceed to ER if symptoms worsen. Chief Complaint     Chief Complaint   Patient presents with   • Back Pain     Right upper back pain for 2 days, lifted a door 10 times 2 days ago         History of Present Illness       Right upper back pain starting 2 days ago after lifting a door about 10 times to get it to close. States have been trying Naproxen, flexeril, heat, hot showers, stretching exercises all without complete relief. Also tried a hydrocodone from when she has surgery and that was not effective either. Review of Systems   Review of Systems   Constitutional: Negative for chills and fever. HENT: Negative for ear pain and sore throat. Eyes: Negative for pain and visual disturbance. Respiratory: Negative for cough and shortness of breath. Cardiovascular: Negative for chest pain and palpitations. Gastrointestinal: Negative for abdominal pain and vomiting. Genitourinary: Negative for dysuria and hematuria. Musculoskeletal: Positive for myalgias (Right scapula).  Negative for arthralgias, back pain, neck pain and neck stiffness. Skin: Negative for color change and rash. Neurological: Negative for dizziness, seizures, syncope, weakness and numbness. All other systems reviewed and are negative. Current Medications       Current Outpatient Medications:   •  bimatoprost (LUMIGAN) 0.01 % ophthalmic drops, 1 drop daily at bedtime, Disp: , Rfl:   •  cyclobenzaprine (FLEXERIL) 5 mg tablet, Take 1 tablet (5 mg total) by mouth 3 (three) times a day as needed for muscle spasms, Disp: 30 tablet, Rfl: 0  •  ergocalciferol (ERGOCALCIFEROL) 1.25 MG (19637 UT) capsule, Take 1 capsule by mouth once a week, Disp: , Rfl:   •  rosuvastatin (CRESTOR) 10 MG tablet, TAKE 1 TABLET BY MOUTH EVERY DAY AT NIGHT, Disp: , Rfl:   •  montelukast (SINGULAIR) 10 mg tablet, Take 10 mg by mouth (Patient not taking: Reported on 8/2/2023), Disp: , Rfl:   •  oxymetazoline (AFRIN) 0.05 % nasal spray, 2 sprays by Each Nare route 2 (two) times a day for 3 days, Disp: 30 mL, Rfl: 0    Current Allergies     Allergies as of 08/02/2023 - Reviewed 08/02/2023   Allergen Reaction Noted   • Cetirizine GI Intolerance 05/17/2021   • Clarithromycin  11/26/2013   • Iodinated contrast media  03/22/2019   • Morphine  11/26/2013            The following portions of the patient's history were reviewed and updated as appropriate: allergies, current medications, past family history, past medical history, past social history, past surgical history and problem list.     Past Medical History:   Diagnosis Date   • Glaucoma        Past Surgical History:   Procedure Laterality Date   • LIVER SURGERY     • RIGHT COLON RESECTION         History reviewed. No pertinent family history. Medications have been verified. Objective   /59   Pulse 90   Temp 98.6 °F (37 °C) (Temporal)   Resp 18   Ht 5' 4" (1.626 m)   Wt 59.4 kg (131 lb)   SpO2 100%   BMI 22.49 kg/m²   No LMP recorded.        Physical Exam     Physical Exam  Vitals and nursing note reviewed. Constitutional:       Appearance: Normal appearance. HENT:      Head: Normocephalic and atraumatic. Musculoskeletal:        Arms:    Skin:     General: Skin is warm and dry. Capillary Refill: Capillary refill takes less than 2 seconds. Neurological:      General: No focal deficit present. Mental Status: She is alert and oriented to person, place, and time. Mental status is at baseline. Sensory: No sensory deficit. Motor: No weakness. Psychiatric:         Mood and Affect: Mood normal.         Behavior: Behavior normal.         Thought Content:  Thought content normal.

## 2023-09-14 ENCOUNTER — APPOINTMENT (OUTPATIENT)
Dept: LAB | Facility: CLINIC | Age: 53
End: 2023-09-14
Payer: COMMERCIAL

## 2023-09-14 DIAGNOSIS — J32.4 CHRONIC PANSINUSITIS: ICD-10-CM

## 2023-09-14 LAB
BASOPHILS # BLD AUTO: 0.07 THOUSANDS/ÂΜL (ref 0–0.1)
BASOPHILS NFR BLD AUTO: 1 % (ref 0–1)
EOSINOPHIL # BLD AUTO: 0.37 THOUSAND/ÂΜL (ref 0–0.61)
EOSINOPHIL NFR BLD AUTO: 5 % (ref 0–6)
ERYTHROCYTE [DISTWIDTH] IN BLOOD BY AUTOMATED COUNT: 12.8 % (ref 11.6–15.1)
HCT VFR BLD AUTO: 40.5 % (ref 34.8–46.1)
HGB BLD-MCNC: 13.4 G/DL (ref 11.5–15.4)
IGA SERPL-MCNC: 152 MG/DL (ref 66–433)
IGG SERPL-MCNC: 987 MG/DL (ref 635–1741)
IGM SERPL-MCNC: 73 MG/DL (ref 45–281)
IMM GRANULOCYTES # BLD AUTO: 0.02 THOUSAND/UL (ref 0–0.2)
IMM GRANULOCYTES NFR BLD AUTO: 0 % (ref 0–2)
LYMPHOCYTES # BLD AUTO: 1.84 THOUSANDS/ÂΜL (ref 0.6–4.47)
LYMPHOCYTES NFR BLD AUTO: 25 % (ref 14–44)
MCH RBC QN AUTO: 32.4 PG (ref 26.8–34.3)
MCHC RBC AUTO-ENTMCNC: 33.1 G/DL (ref 31.4–37.4)
MCV RBC AUTO: 98 FL (ref 82–98)
MONOCYTES # BLD AUTO: 0.46 THOUSAND/ÂΜL (ref 0.17–1.22)
MONOCYTES NFR BLD AUTO: 6 % (ref 4–12)
NEUTROPHILS # BLD AUTO: 4.73 THOUSANDS/ÂΜL (ref 1.85–7.62)
NEUTS SEG NFR BLD AUTO: 63 % (ref 43–75)
NRBC BLD AUTO-RTO: 0 /100 WBCS
PLATELET # BLD AUTO: 266 THOUSANDS/UL (ref 149–390)
PMV BLD AUTO: 10.3 FL (ref 8.9–12.7)
RBC # BLD AUTO: 4.14 MILLION/UL (ref 3.81–5.12)
WBC # BLD AUTO: 7.49 THOUSAND/UL (ref 4.31–10.16)

## 2023-09-14 PROCEDURE — 82784 ASSAY IGA/IGD/IGG/IGM EACH: CPT

## 2023-09-14 PROCEDURE — 85025 COMPLETE CBC W/AUTO DIFF WBC: CPT

## 2023-09-14 PROCEDURE — 86317 IMMUNOASSAY INFECTIOUS AGENT: CPT

## 2023-09-14 PROCEDURE — 82787 IGG 1 2 3 OR 4 EACH: CPT

## 2023-09-14 PROCEDURE — 82785 ASSAY OF IGE: CPT

## 2023-09-14 PROCEDURE — 86003 ALLG SPEC IGE CRUDE XTRC EA: CPT

## 2023-09-14 PROCEDURE — 36415 COLL VENOUS BLD VENIPUNCTURE: CPT

## 2023-09-16 LAB
IGG SERPL-MCNC: 1071 MG/DL (ref 586–1602)
IGG1 SER-MCNC: 596 MG/DL (ref 248–810)
IGG2 SER-MCNC: 325 MG/DL (ref 130–555)
IGG3 SER-MCNC: 45 MG/DL (ref 15–102)
IGG4 SER-MCNC: 93 MG/DL (ref 2–96)

## 2023-09-19 LAB
A ALTERNATA IGE QN: <0.1 KUA/I
A FUMIGATUS IGE QN: <0.1 KUA/I
BERMUDA GRASS IGE QN: 1.83 KUA/I
BOXELDER IGE QN: <0.1 KUA/I
C HERBARUM IGE QN: <0.1 KUA/I
CAT DANDER IGE QN: <0.1 KUA/I
CMN PIGWEED IGE QN: <0.1 KUA/I
COMMON RAGWEED IGE QN: <0.1 KUA/I
COTTONWOOD IGE QN: <0.1 KUA/I
D FARINAE IGE QN: 0.44 KUA/I
D PTERONYSS IGE QN: 0.61 KUA/I
DOG DANDER IGE QN: <0.1 KUA/I
LONDON PLANE IGE QN: <0.1 KUA/I
MOUSE URINE PROT IGE QN: <0.1 KUA/I
MT JUNIPER IGE QN: <0.1 KUA/I
MUGWORT IGE QN: <0.1 KUA/I
P NOTATUM IGE QN: <0.1 KUA/I
ROACH IGE QN: 0.15 KUA/I
SHEEP SORREL IGE QN: <0.1 KUA/I
SILVER BIRCH IGE QN: <0.1 KUA/I
TIMOTHY IGE QN: 2.27 KUA/I
TOTAL IGE SMQN RAST: 219 KU/L (ref 0–113)
WALNUT IGE QN: <0.1 KUA/I
WHITE ASH IGE QN: <0.1 KUA/I
WHITE ELM IGE QN: <0.1 KUA/I
WHITE MULBERRY IGE QN: <0.1 KUA/I
WHITE OAK IGE QN: <0.1 KUA/I

## 2023-09-21 LAB
DEPRECATED S PNEUM14 IGG SER-MCNC: 1.4 UG/ML
DEPRECATED S PNEUM19 IGG SER-MCNC: 3.2 UG/ML
DEPRECATED S PNEUM23 IGG SER-MCNC: 0.6 UG/ML
DEPRECATED S PNEUM3 IGG SER-MCNC: 0.3 UG/ML
DEPRECATED S PNEUM4 IGG SER-MCNC: <0.1 UG/ML
DEPRECATED S PNEUM8 AB SER-MCNC: 0.2 UG/ML
DEPRECATED S PNEUM9 IGG SER-MCNC: <0.1 UG/ML
FLUBV RNA SPEC QL NAA+PROBE: <0.1 UG/ML
S PNEUM DA 18C IGG SER-MCNC: 0.3 UG/ML
S PNEUM DA 19A IGG SER-MCNC: 4.6 UG/ML
S PNEUM DA 6B IGG SER-MCNC: 0.8 UG/ML
SL AMB PNEUMO AB TYPE 17 (17F): 0.1 UG/ML
SL AMB PNEUMO AB TYPE 20: 1.2 UG/ML
SL AMB PNEUMO AB TYPE 22 (22F): <0.1 UG/ML
SL AMB PNEUMO AB TYPE 2: 3.2 UG/ML
SL AMB PNEUMO AB TYPE 34 (10A): 0.6 UG/ML
SL AMB PNEUMO AB TYPE 43 (11A): 7.2 UG/ML
SL AMB PNEUMO AB TYPE 54 (15B): 0.7 UG/ML
SL AMB PNEUMO AB TYPE 5: <0.1 UG/ML
SL AMB PNEUMO AB TYPE 70 (33F): 0.8 UG/ML
STREP PNEUMO TYPE 1: 0.3 UG/ML
STREP PNEUMO TYPE 51: 0.3 UG/ML
STREP PNEUMO TYPE 68: <0.1 UG/ML

## 2024-01-08 ENCOUNTER — APPOINTMENT (OUTPATIENT)
Dept: RADIOLOGY | Facility: CLINIC | Age: 54
End: 2024-01-08
Payer: COMMERCIAL

## 2024-01-08 ENCOUNTER — OFFICE VISIT (OUTPATIENT)
Dept: URGENT CARE | Facility: CLINIC | Age: 54
End: 2024-01-08
Payer: COMMERCIAL

## 2024-01-08 VITALS
DIASTOLIC BLOOD PRESSURE: 63 MMHG | OXYGEN SATURATION: 97 % | HEART RATE: 74 BPM | RESPIRATION RATE: 20 BRPM | TEMPERATURE: 98 F | SYSTOLIC BLOOD PRESSURE: 126 MMHG

## 2024-01-08 DIAGNOSIS — R09.89 SYMPTOMS OF UPPER RESPIRATORY INFECTION (URI): ICD-10-CM

## 2024-01-08 DIAGNOSIS — M54.6 ACUTE THORACIC BACK PAIN, UNSPECIFIED BACK PAIN LATERALITY: ICD-10-CM

## 2024-01-08 DIAGNOSIS — R05.1 ACUTE COUGH: ICD-10-CM

## 2024-01-08 DIAGNOSIS — L02.214 ABSCESS OF RIGHT GROIN: Primary | ICD-10-CM

## 2024-01-08 PROCEDURE — 87636 SARSCOV2 & INF A&B AMP PRB: CPT | Performed by: NURSE PRACTITIONER

## 2024-01-08 PROCEDURE — S9088 SERVICES PROVIDED IN URGENT: HCPCS | Performed by: NURSE PRACTITIONER

## 2024-01-08 PROCEDURE — 99214 OFFICE O/P EST MOD 30 MIN: CPT | Performed by: NURSE PRACTITIONER

## 2024-01-08 PROCEDURE — 71046 X-RAY EXAM CHEST 2 VIEWS: CPT

## 2024-01-08 RX ORDER — AMOXICILLIN AND CLAVULANATE POTASSIUM 875; 125 MG/1; MG/1
1 TABLET, FILM COATED ORAL EVERY 12 HOURS SCHEDULED
Qty: 14 TABLET | Refills: 0 | Status: SHIPPED | OUTPATIENT
Start: 2024-01-08 | End: 2024-01-15

## 2024-01-08 NOTE — PATIENT INSTRUCTIONS
Your xray was preliminarily read by your provider.  A radiologist will read the xray and you will be notified if it is abnormal.    You have been prescribed augmentin for abscess and respiratory infection  You need to see your family doctor for follow up when scheduled.   Go to the ED if symptoms worsen    You have a covid/flu test pending. You are to download archify for the results in 24-48 hours   You will be notified if abnormal

## 2024-01-08 NOTE — RESULT ENCOUNTER NOTE
Spoke with patient regarding need of repeat CT chest. She states she will discuss with PCP at the end of the month when she has her appointment

## 2024-01-08 NOTE — LETTER
[facility]  [address] [place] [address]      January 11, 2024    MRN: [id number]     Phone: [phone]     Dear Ms. Flowers,    You recently had a(n) Diagnostic Imaging performed on 1/8/2024 at  [facility] that was requested by ADAIR Willams. The study was reviewed by a radiologist, which is a physician who specializes in medical imaging. The radiologist issued a report describing his or her findings. In that report there was a finding that the radiologist felt warranted further discussion with your health care provider and that discussion would be beneficial to you.      The results were sent to ADAIR Willams on 01/08/2024  3:05 PM. We recommend that you contact ADAIR Willams at [phone] or set up an appointment to discuss the results of the imaging test. If you have already heard from ADAIR Willams regarding the results of your study, you can disregard this letter.     This letter is not meant to alarm you, but intended to encourage you to follow-up on your results with the provider that sent you for the imaging study. In addition, we have enclosed answers to frequently asked questions by other patients who have also received a letter to review results with their health care provider (see page two).      Thank you for choosing [facility] for your medical imaging needs.                                                                                                                                                        FREQUENTLY ASKED QUESTIONS    Why am I receiving this letter?  [place] State Law requires us to notify patients who have findings on imaging exams that may require more testing or follow-up with a health professional within the next 3 months.        How serious is the finding on the imaging test?  This letter is sent to all patients who may need  follow-up or more testing within the next 3 months.  Receiving this letter does not necessarily mean you have a life-threatening imaging finding or disease.  Recommendations in the radiologist’s imaging report are general in nature and it is up to your healthcare provider to say whether those recommendations make sense for your situation.  You are strongly encouraged to talk to your health care provider about the results and ask whether additional steps need to be taken.    Where can I get a copy of the final report for my recent radiology exam?  To get a full copy of the report you can access your records online at [email or web address] or please contact [facility]’s Medical Records Department at [phone] Monday through Friday between 8 am and 6 pm.         What do I need to do now?           Please contact your health care provider who requested the imaging study to discuss what further actions (if any) are needed.  You may have already heard from (your ordering provider) in regard to this test in which case you can disregard this letter.        NOTICE IN ACCORDANCE WITH THE [place] STATE “PATIENT TEST RESULT INFORMATION ACT OF 2018”    You are receiving this notice as a result of a determination by your diagnostic imaging service that further discussions of your test results are warranted and would be beneficial to you.    The complete results of your test or tests have been or will be sent to the health care practitioner that ordered the test or tests. It is recommended that you contact your health care practitioner to discuss your results as soon as possible.

## 2024-01-08 NOTE — PROGRESS NOTES
"  [facility]        NAME: Deb Flowers is a 53 y.o. female  : 1970    MRN: [id number]  DATE: 2024  TIME: 1:16 PM    Assessment and Plan   Abscess of right groin [L02.214]  1. Abscess of right groin  amoxicillin-clavulanate (AUGMENTIN) 875-125 mg per tablet      2. Acute cough  Covid/Flu-Office Collect    amoxicillin-clavulanate (AUGMENTIN) 875-125 mg per tablet      3. Acute thoracic back pain, unspecified back pain laterality  XR chest pa & lateral    amoxicillin-clavulanate (AUGMENTIN) 875-125 mg per tablet      4. Symptoms of upper respiratory infection (URI)  amoxicillin-clavulanate (AUGMENTIN) 875-125 mg per tablet            Patient Instructions       Follow up with PCP in 3-5 days.  Proceed to  ER if symptoms worsen.  Your xray was preliminarily read by your provider.  A radiologist will read the xray and you will be notified if it is abnormal.    You have been prescribed augmentin for abscess and respiratory infection  You need to see your family doctor for follow up when scheduled.   Go to the ED if symptoms worsen    You have a covid/flu test pending. You are to download [facility] for the results in 24-48 hours   You will be notified if abnormal             Chief Complaint     Chief Complaint   Patient presents with    Cough     C/o productive cough, sore throat, and back pain onset \"last week\". Denies PCP contact. Denies any OTC medications. Denies home COVID testing. Denies fall/trauma.     Back Pain    Sore Throat    Abscess     C/o mulitple abscesses \"and I figure while I'm here I should have it looked at\". Pt denies PCP contact, denies seeking dermatology consult.          History of Present Illness       This is a 53 year old female who is here for productive cough, sorethroat and back pain since last week. She states that this cough today is deeper and thicker yellow.  She stopped smoking 2023.  She denies taking anything OTC for her symptoms, denies covid testing, " "denies contacting PCP of her symptoms, she states [facility]  Mr[facility] Enrique BORRERO.  She state she has had a cough for at least 1 year. She states that last year she \"was coughing up blood and an abscess was found so she was given a medication\".  She denies coughing up blood this time.  She denies fevers, chills, n/v/d.    She states she has had nasal surgery for her stuffiness and still has it.   She does admit she had stage 4 colon cancer 2005.  She followed up with CT chest abnormality at [facility] and no new disease and decreasing mass was noted per notes documented     She also c/o multiple abscesses. She states she has an abscess of the right groin that is draining and slightly painful. She states that she has one that is heeled at the gluteal cleft.        Cough  Associated symptoms include a sore throat.   Back Pain    Sore Throat   Associated symptoms include coughing.   Abscess  Associated symptoms include coughing and a sore throat.       Review of Systems   Review of Systems   HENT:  Positive for sore throat.    Respiratory:  Positive for cough.    Musculoskeletal:  Positive for back pain.         Current Medications       Current Outpatient Medications:     amoxicillin-clavulanate (AUGMENTIN) 875-125 mg per tablet, Take 1 tablet by mouth every 12 (twelve) hours for 7 days, Disp: 14 tablet, Rfl: 0    bimatoprost (LUMIGAN) 0.01 % ophthalmic drops, 1 drop daily at bedtime, Disp: , Rfl:     rosuvastatin (CRESTOR) 10 MG tablet, TAKE 1 TABLET BY MOUTH EVERY DAY AT NIGHT, Disp: , Rfl:     cyclobenzaprine (FLEXERIL) 5 mg tablet, Take 1 tablet (5 mg total) by mouth 3 (three) times a day as needed for muscle spasms, Disp: 30 tablet, Rfl: 0    ergocalciferol (ERGOCALCIFEROL) 1.25 MG (72003 UT) capsule, Take 1 capsule by mouth once a week, Disp: , Rfl:     montelukast (SINGULAIR) 10 mg tablet, Take 10 mg by mouth (Patient not taking: Reported on 8/2/2023), Disp: , " Rfl:     oxymetazoline (AFRIN) 0.05 % nasal spray, 2 sprays by Each Nare route 2 (two) times a day for 3 days, Disp: 30 mL, Rfl: 0    Current Allergies     Allergies as of 01/08/2024 - Reviewed 01/08/2024   Allergen Reaction Noted    Cetirizine GI Intolerance 05/17/2021    Clarithromycin  11/26/2013    Iodinated contrast media  03/22/2019    Morphine  11/26/2013            The following portions of the patient's history were reviewed and updated as appropriate: allergies, current medications, past family history, past medical history, past social history, past surgical history and problem list.     Past Medical History:   Diagnosis Date    Cancer (HCC) 2005    Colon CA stage 4    Glaucoma        Past Surgical History:   Procedure Laterality Date    LIVER SURGERY      RIGHT COLON RESECTION         History reviewed. No pertinent family history.      Medications have been verified.        Objective   /63 (BP Location: Right arm, Patient Position: Sitting)   Pulse 74   Temp 98 °F (36.7 °C) (Temporal)   Resp 20   SpO2 97%   No LMP recorded.       Physical Exam     Physical Exam      Preliminary reading CXR   + nodule/mass noted RUL (seen on CT 8/23)  Waiting on rad read           CT Scan [facility] 8/11/23  and CT scan 8/18/23 at [facility]  EMR note dated 9/11/23 states decreased RUL abscess. No evidence of thoracic metastatic disease

## 2024-01-08 NOTE — RESULT ENCOUNTER NOTE
IMPRESSION: CXR    Right upper lobe peripherally located opacity which is indeterminate in etiology. A mass could not be excluded. The patient's electronic medical record contains a report from an outside institution chest CT which described mass/infiltrate/cavitary lesion   in the right lung. The current finding may be the residua of that. The patient may benefit from a repeat chest CT which hopefully could be compared directly with the outside study if it can be obtained   No obvious spinal pathology within the field-of-view.   The study was marked in EPIC for immediate notification.    I had discussed noted mass/nodule noted in RUL.  I did inform that she would most likely need repeat chest CT.She is currently in the midst of changing PCP to Mr. Michele BORRERO.   LM on pt VM to return call to discuss radiology testing.      Please have your office contact pt for follow up. She is scheduled as new pt in the next few weeks.

## 2024-01-09 LAB
FLUAV RNA RESP QL NAA+PROBE: NEGATIVE
FLUBV RNA RESP QL NAA+PROBE: NEGATIVE
SARS-COV-2 RNA RESP QL NAA+PROBE: NEGATIVE

## 2024-01-13 ENCOUNTER — APPOINTMENT (OUTPATIENT)
Dept: RADIOLOGY | Facility: CLINIC | Age: 54
End: 2024-01-13
Payer: COMMERCIAL

## 2024-01-13 ENCOUNTER — OFFICE VISIT (OUTPATIENT)
Dept: URGENT CARE | Facility: CLINIC | Age: 54
End: 2024-01-13
Payer: COMMERCIAL

## 2024-01-13 VITALS
OXYGEN SATURATION: 97 % | BODY MASS INDEX: 24.55 KG/M2 | RESPIRATION RATE: 16 BRPM | HEART RATE: 72 BPM | WEIGHT: 143.8 LBS | TEMPERATURE: 97 F | HEIGHT: 64 IN

## 2024-01-13 DIAGNOSIS — S63.502A LEFT WRIST SPRAIN, INITIAL ENCOUNTER: ICD-10-CM

## 2024-01-13 DIAGNOSIS — S63.502A LEFT WRIST SPRAIN, INITIAL ENCOUNTER: Primary | ICD-10-CM

## 2024-01-13 PROCEDURE — 99213 OFFICE O/P EST LOW 20 MIN: CPT

## 2024-01-13 PROCEDURE — S9088 SERVICES PROVIDED IN URGENT: HCPCS

## 2024-01-13 PROCEDURE — 73110 X-RAY EXAM OF WRIST: CPT

## 2024-01-13 NOTE — PROGRESS NOTES
[facility]    NAME: Deb Flowers is a 53 y.o. female  : 1970    MRN: [id number]  DATE: 2024  TIME: 3:29 PM    Assessment and Plan   Left wrist sprain, initial encounter [S63.502A]  1. Left wrist sprain, initial encounter  XR wrist 3+ vw left        XR of showing -- no acute fracture. Will follow up if any additional findings on final reading.   Start with ibuprofen for pain.  Follow up with orthopedics if symptoms do not improve.       Patient Instructions     -Practice RICE : rest the injured area, apply ice, apply compression such as an ACE wrap to the site, and elevation- keep the injured area up.    -For pain take an NSAID such as ibuprofen, Aleve, or motrin if able. This will decrease pain and swelling to the area. If unable to take, can try over the counter Voltaren cream instead.     -If pain continues can also take these medications - can try over the counter medications (these do not need a prescription) such as acetaminophen (Tylenol), lidocaine or other pain patches, Voltaren cream, or lidocaine cream.     -If there are any additional findings on your Xray our office will call you or you can see results on [facility] in about 24-48 hours.    -Follow up with orthopedics if pain worsening or not improving. You can call [phone] to schedule an appointment with [facility] orthopedics.       Chief Complaint     Chief Complaint   Patient presents with    Wrist Pain     Left wrist;          History of Present Illness       Presents with left wrist pain. This started after shoveling and then worsening today. Noted swelling and bruising present. Denies other injury to the site. Worse with extremes in movement or putting weight on the site. Pushes off from the besides with pain.         Review of Systems   Review of Systems   Constitutional:  Negative for fever.   HENT:  Negative for congestion.    Respiratory:  Negative for cough and shortness of breath.    Cardiovascular:  Negative for  "chest pain.   Musculoskeletal:  Positive for myalgias.   Skin:  Positive for color change. Negative for wound.   Neurological:  Negative for numbness.   Psychiatric/Behavioral:  Negative for confusion.          Current Medications       Current Outpatient Medications:     amoxicillin-clavulanate (AUGMENTIN) 875-125 mg per tablet, Take 1 tablet by mouth every 12 (twelve) hours for 7 days, Disp: 14 tablet, Rfl: 0    bimatoprost (LUMIGAN) 0.01 % ophthalmic drops, 1 drop daily at bedtime, Disp: , Rfl:     cyclobenzaprine (FLEXERIL) 5 mg tablet, Take 1 tablet (5 mg total) by mouth 3 (three) times a day as needed for muscle spasms, Disp: 30 tablet, Rfl: 0    ergocalciferol (ERGOCALCIFEROL) 1.25 MG (74552 UT) capsule, Take 1 capsule by mouth once a week, Disp: , Rfl:     montelukast (SINGULAIR) 10 mg tablet, Take 10 mg by mouth (Patient not taking: Reported on 8/2/2023), Disp: , Rfl:     oxymetazoline (AFRIN) 0.05 % nasal spray, 2 sprays by Each Nare route 2 (two) times a day for 3 days, Disp: 30 mL, Rfl: 0    rosuvastatin (CRESTOR) 10 MG tablet, TAKE 1 TABLET BY MOUTH EVERY DAY AT NIGHT, Disp: , Rfl:     Current Allergies     Allergies as of 01/13/2024 - Reviewed 01/08/2024   Allergen Reaction Noted    Cetirizine GI Intolerance 05/17/2021    Clarithromycin  11/26/2013    Iodinated contrast media  03/22/2019    Morphine  11/26/2013            The following portions of the patient's history were reviewed and updated as appropriate: allergies, current medications, past family history, past medical history, past social history, past surgical history and problem list.     Past Medical History:   Diagnosis Date    Cancer (HCC) 2005    Colon CA stage 4    Glaucoma        Past Surgical History:   Procedure Laterality Date    LIVER SURGERY      RIGHT COLON RESECTION         No family history on file.      Medications have been verified.        Objective   Pulse 72   Temp (!) 97 °F (36.1 °C)   Resp 16   Ht 5' 4\" (1.626 m)   Wt " 65.2 kg (143 lb 12.8 oz)   SpO2 97%   BMI 24.68 kg/m²        Physical Exam     Physical Exam  Vitals reviewed.   Constitutional:       Appearance: Normal appearance.   Cardiovascular:      Rate and Rhythm: Normal rate and regular rhythm.      Pulses: Normal pulses.   Pulmonary:      Effort: Pulmonary effort is normal. No respiratory distress.   Musculoskeletal:      Left forearm: Normal.      Left wrist: Swelling and tenderness present. No bony tenderness or crepitus. Decreased range of motion. Normal pulse.      Left hand: Normal. There is no disruption of two-point discrimination. Normal pulse.   Skin:     General: Skin is warm and dry.      Capillary Refill: Capillary refill takes less than 2 seconds.   Neurological:      General: No focal deficit present.      Mental Status: She is alert and oriented to person, place, and time.   Psychiatric:         Mood and Affect: Mood normal.         Behavior: Behavior normal.

## 2024-01-13 NOTE — PATIENT INSTRUCTIONS
-Practice RICE : rest the injured area, apply ice, apply compression such as an ACE wrap to the site, and elevation- keep the injured area up.    -For pain take an NSAID such as ibuprofen, Aleve, or motrin if able. This will decrease pain and swelling to the area. If unable to take, can try over the counter Voltaren cream instead.     -If pain continues can also take these medications - can try over the counter medications (these do not need a prescription) such as acetaminophen (Tylenol), lidocaine or other pain patches, Voltaren cream, or lidocaine cream.     -If there are any additional findings on your Xray our office will call you or you can see results on [facility] in about 24-48 hours.    -Follow up with orthopedics if pain worsening or not improving. You can call [phone] to schedule an appointment with [facility] orthopedics.

## 2024-01-17 ENCOUNTER — TELEPHONE (OUTPATIENT)
Dept: URGENT CARE | Facility: CLINIC | Age: 54
End: 2024-01-17

## 2024-01-17 DIAGNOSIS — M11.20 PSEUDOGOUT: Primary | ICD-10-CM

## 2024-01-17 RX ORDER — METHYLPREDNISOLONE 4 MG/1
TABLET ORAL
Qty: 21 EACH | Refills: 0 | Status: SHIPPED | OUTPATIENT
Start: 2024-01-17

## 2024-01-17 NOTE — TELEPHONE ENCOUNTER
Called pt and reported her xray could be consistent with pseudogout. Pain continues to be the same despite OTC medications. Will start on steroids. No additional questions.

## 2024-01-30 ENCOUNTER — OFFICE VISIT (OUTPATIENT)
Dept: FAMILY MEDICINE CLINIC | Facility: CLINIC | Age: 54
End: 2024-01-30
Payer: COMMERCIAL

## 2024-01-30 ENCOUNTER — TELEPHONE (OUTPATIENT)
Dept: ADMINISTRATIVE | Facility: OTHER | Age: 54
End: 2024-01-30

## 2024-01-30 VITALS
WEIGHT: 142.6 LBS | BODY MASS INDEX: 24.34 KG/M2 | DIASTOLIC BLOOD PRESSURE: 82 MMHG | OXYGEN SATURATION: 97 % | SYSTOLIC BLOOD PRESSURE: 112 MMHG | TEMPERATURE: 97.8 F | HEIGHT: 64 IN | HEART RATE: 87 BPM

## 2024-01-30 DIAGNOSIS — H69.93 EUSTACHIAN TUBE DYSFUNCTION, BILATERAL: ICD-10-CM

## 2024-01-30 DIAGNOSIS — J32.4 CHRONIC PANSINUSITIS: ICD-10-CM

## 2024-01-30 DIAGNOSIS — Z13.1 SCREENING FOR DIABETES MELLITUS: ICD-10-CM

## 2024-01-30 DIAGNOSIS — Z01.419 WELL WOMAN EXAM: ICD-10-CM

## 2024-01-30 DIAGNOSIS — Z13.0 SCREENING FOR DEFICIENCY ANEMIA: ICD-10-CM

## 2024-01-30 DIAGNOSIS — Z11.4 SCREENING FOR HIV (HUMAN IMMUNODEFICIENCY VIRUS): ICD-10-CM

## 2024-01-30 DIAGNOSIS — Z23 ENCOUNTER FOR IMMUNIZATION: ICD-10-CM

## 2024-01-30 DIAGNOSIS — F17.200 SMOKER: ICD-10-CM

## 2024-01-30 DIAGNOSIS — R73.03 PREDIABETES: ICD-10-CM

## 2024-01-30 DIAGNOSIS — Z13.29 SCREENING FOR THYROID DISORDER: ICD-10-CM

## 2024-01-30 DIAGNOSIS — Z12.31 BREAST CANCER SCREENING BY MAMMOGRAM: ICD-10-CM

## 2024-01-30 DIAGNOSIS — Z76.89 ENCOUNTER TO ESTABLISH CARE: Primary | ICD-10-CM

## 2024-01-30 DIAGNOSIS — Z12.31 ENCOUNTER FOR SCREENING MAMMOGRAM FOR BREAST CANCER: ICD-10-CM

## 2024-01-30 DIAGNOSIS — Z13.31 DEPRESSION SCREENING NEGATIVE: ICD-10-CM

## 2024-01-30 DIAGNOSIS — Z11.59 NEED FOR HEPATITIS C SCREENING TEST: ICD-10-CM

## 2024-01-30 DIAGNOSIS — Z12.4 SCREENING FOR CERVICAL CANCER: ICD-10-CM

## 2024-01-30 DIAGNOSIS — J34.2 DEVIATED NASAL SEPTUM: ICD-10-CM

## 2024-01-30 DIAGNOSIS — J18.1 CONSOLIDATION OF RIGHT UPPER LOBE OF LUNG (HCC): ICD-10-CM

## 2024-01-30 DIAGNOSIS — Z85.038 HISTORY OF COLON CANCER: ICD-10-CM

## 2024-01-30 DIAGNOSIS — Z13.220 SCREENING FOR LIPID DISORDERS: ICD-10-CM

## 2024-01-30 PROBLEM — E78.2 HYPERLIPIDEMIA, MIXED: Status: ACTIVE | Noted: 2019-03-21

## 2024-01-30 PROBLEM — F17.210 CIGARETTE SMOKER: Status: ACTIVE | Noted: 2019-03-22

## 2024-01-30 PROCEDURE — 99204 OFFICE O/P NEW MOD 45 MIN: CPT | Performed by: NURSE PRACTITIONER

## 2024-01-30 RX ORDER — NAPROXEN SODIUM 220 MG
220 TABLET ORAL EVERY 12 HOURS PRN
COMMUNITY

## 2024-01-30 NOTE — PATIENT INSTRUCTIONS
Prediabetes   AMBULATORY CARE:   Prediabetes  is a blood glucose level that is higher than normal. It is not high enough to be considered diabetes. Prediabetes increases your risk for type 2 diabetes and heart disease.   Common symptoms include the following:  Prediabetes may not cause any symptoms, or it may cause symptoms similar to diabetes. These may include any of the following:  More hunger or thirst than usual     Frequent urination     Weight loss without trying     Blurred vision  Contact your healthcare provider if:   You have more hunger or thirst than usual.     You are urinating more frequently than normal.     You lose weight without trying.     You have blurred vision.    You have questions or concerns about your condition or care.  Medicines  may be given to control your blood sugar levels. Medicine may also be given to lower high blood pressure and high cholesterol.   Manage prediabetes:  Weight loss and exercise work best to delay or prevent type 2 diabetes. You can decrease your risk of type 2 diabetes by doing the following:  Lose weight if you are overweight.  A weight loss of 7% of your body weight can help to lower your blood sugar level. For example, if you weigh 200 pounds, you should lose 14 pounds.     Exercise regularly.  Exercise can help decrease your blood sugar level. It can also help to decrease your risk of heart disease and help you lose weight. Adults should exercise for at least 150 minutes every week. Spread this amount of exercise over at least 3 days a week. Do not skip exercise more than 2 days in a row. Children should exercise for at least 60 minutes on most days of the week. Examples of exercise include walking or swimming. Do not sit for longer than 30 minutes. Work with your healthcare provider to create an exercise plan.     Decrease the amount of calories you eat to help you lose weight. Eat a variety of fruits and vegetables, and eat whole-grain foods more often.  Choose dairy foods, meat, and other protein foods that are low in fat. Eat fewer sweets such as candy, cookies, regular soda, and sweetened drinks. You can also decrease calories by eating smaller portion sizes. Work with your healthcare provider or dietitian to develop a meal plan that is right for you.    Do not smoke.  Nicotine can damage blood vessels. Do not use e-cigarettes or smokeless tobacco in place of cigarettes or to help you quit. They still contain nicotine. Ask your healthcare provider for information if you currently smoke and need help quitting.  Follow up with your healthcare provider as directed:  You will need to return every year to get tested for diabetes. Write down your questions so you remember to ask them during your visits.   © 2017 [email or web address] Information is for End User's use only and may not be sold, redistributed or otherwise used for commercial purposes. All illustrations and images included in CareNotes® are the copyrighted property of [employer] or [employer].  The above information is an  only. It is not intended as medical advice for individual conditions or treatments. Talk to your doctor, nurse or pharmacist before following any medical regimen to see if it is safe and effective for you.    Meal Planning with Diabetes Exchanges   AMBULATORY CARE:   Diabetes exchanges  are servings of food that contain similar amounts of carbohydrate, fat, protein, and calories within a food group. The exchanges can be used to develop a healthy meal plan that helps to keep your blood sugar within the recommended levels. A meal plan with the right amount of carbohydrates is especially important. Your blood sugar naturally rises after you eat carbohydrates. Too many carbohydrates in 1 meal or snack can raise your blood sugar level. Carbohydrates are found in starches, fruit, milk, yogurt, and sweets.  How to create a meal plan with exchanges:  A  dietitian will work with you to develop a healthy meal plan that is right for you. This meal plan will include the amount of exchanges you can have from each food group throughout the day. Follow your meal plan by keeping track of the amount of exchanges you eat for each meal and snack. Your meal plan will be based on your age, weight, blood sugar levels, medicine, and activity level.   Starch food group exchanges:  Each exchange below contains about 15 grams of carbohydrate , 3 grams of protein, 1 gram of fat, and 80 calories.  1 ounce of white, whole wheat or rye bread (1 slice)    1 ounce of bagel (about ¼ of a bagel)    1 6-inch flour or corn tortilla or 1 4-inch pancake (about ¼ inch thick)    ? cup of cooked pasta or rice    ¾ cup of dry, ready-to-eat cereal with no sugar added     ½ cup of cooked cereal, such as oatmeal    3 markel cracker squares or 8 animal crackers    6 saltine-type crackers or     3 cups of popcorn or ¾ ounce of pretzels     Starchy vegetables and cooked legumes:      ½ cup of corn, green peas, sweet potatoes, or mashed potatoes     ¼ of a large baked potato     1 cup of acorn, butternut squash, or pumpkin     ½ cup of beans, lentils, or peas (such as ramirez, kidney, or black-eyed)    ? cup of lima beans  Fruit group exchanges:  Each exchange contains about 15 grams of carbohydrate  and 60 calories.  1 small (4 ounce) apple, banana orange, or nectarine    ½ cup of canned or fresh fruit    ½ cup (4 ounces) of unsweetened fruit juice    2 tablespoons of dried fruit  Milk group exchanges:  Each exchange contains about 12 grams of carbohydrate  and 8 grams of protein. The amount of fat and calories in each serving depends on the type of milk (such as whole, low-fat, or fat-free).  1 cup fat-free or low-fat milk    ¾ cup of plain, nonfat yogurt    1 cup fat-free, flavored yogurt with artificial (no calorie) sweetener  Non-starchy vegetable group exchanges:  Each exchange contains about 5 grams  of carbohydrate , 2 grams of protein, and 25 calories. Examples include beets, broccoli, cabbage, carrots, cauliflower, cucumber, mushrooms, tomatoes, and zucchini.  ½ cup of cooked vegetables or 1 cup of raw vegetables     ½ cup of vegetable juice  Meat and meat substitute group exchanges:  Each exchange of a lean meat  listed below contains about 7 grams of protein, 0 to 3 grams of fat, and 45 calories. The meat and meat substitutes food group does not contain any carbohydrates. Medium and high-fat meats have more calories.  1 ounce of chicken or turkey without skin, or 1 ounce of fish (not breaded or fried)     1 ounce of lean beef, pork, or lamb     1-inch cube or 1 ounce of low-fat cheese     2 egg whites or ¼ cup of egg substitute     ½ cup of tofu  Sweets, desserts, and other carbohydrate group exchanges:   Sweets and other desserts:  Each exchange has about 15 grams of carbohydrate .    1 ounce of brennan food cake or 2-inch square cake (unfrosted)    2 small cookies     ½ cup of sugar-free, fat-free ice cream    1 tablespoon of syrup, jam, jelly, table sugar, or honey    Combination foods:     1 cup of an entrée, such as lasagna, spaghetti with meatballs, macaroni and cheese, and chili with beans (each serving counts as 2 carbohydrate exchanges )     1 cup of tomato or vegetable beef soup (each serving counts as 1 carbohydrate exchange )  Fat group exchanges:  Each exchange contains 5 grams of fat and 45 calories.  1 teaspoon of oil (such as canola, olive, or corn oil)     6 almonds or cashews, 10 peanuts, or 4 pecan halves     2 tablespoons of avocado     ½ tablespoon of peanut butter     1 teaspoon of regular margarine or 2 teaspoons of low-fat margarine     1 teaspoon of regular butter or 1 tablespoon of low-fat butter     1 teaspoon of regular mayonnaise or 1 tablespoon of low-fat mayonnaise     1 tablespoon of regular salad dressing or 2 tablespoons of low-fat salad dressing  Free foods: The foods on  this list are called free foods because they have very few calories. Free foods usually do not increase your blood sugar if you limit them.  1 tablespoon of catsup or taco sauce     ¼ cup of salsa     2 tablespoons of sugar-free syrup or 2 teaspoons of light jam or jelly     1 tablespoon of fat-free salad dressing     4 tablespoons of fat-free margarine or fat-free mayonnaise     Sugar-free drinks: diet soda, sugar-free drink mixes, or mineral water     Low-sodium bouillon or fat-free broth     Mustard     Seasonings such as spices, herbs, and garlic     Sugar-free gelatin without added fruit  Other healthy nutrition guidelines:   Eat more fiber.  Choose foods that are good sources of fiber, such as fruits, vegetables, and whole grains. Cereals that contain 5 or more grams of fiber per serving are good sources of fiber. Legumes such as garbanzo, ramirez beans, kidney beans, and lentils are also good sources.     Limit fat.  Ask your dietitian or healthcare provider how much fat you should eat each day. Choose foods low in fat, saturated fat, trans fat, and cholesterol. Examples include turkey or chicken without the skin, fish, lean cuts of meat, and beans. Low-fat dairy foods, such as low-fat or fat-free milk and low-fat yogurt are also good choices. Omega-3 fatty acids are healthy fats that are found in canola oil, soybean oil and fatty fish. [place], albacore tuna, and sardines are good sources of omega 3 fatty acids. Eat 2 servings of these types of fish each week. Do not eat fried fish.     Limit sugar.  Sugar and sweets must be counted toward the carbohydrate exchanges that you can have within your meal plan. Limit sugar and sweets because they are usually also high in calories and fat. Eat smaller portions of sweets by sharing a dessert or asking for a child-size portion at a restaurant.    Limit sodium  (salt) to about 2,300 mg per day. You may need to eat even less sodium if you have certain medical conditions.  Foods high in sodium include soy sauce, potato chips, and soup.     Limit alcohol.  Ask your healthcare provider if it is safe for you to drink alcohol. If alcohol is safe for you to have, eat a meal when you drink alcohol. If you drink alcohol on an empty stomach, your blood sugar may drop to a low level. Women should limit alcohol to 1 drink per day. Men should limit alcohol to 2 drinks per day. A drink of alcohol is 5 ounces of wine, 12 ounces of beer, or 1½ ounces of liquor.  Other ways to manage your diabetes:   Control your blood sugar level.  Test your blood sugar level regularly and keep a record of the results. Ask your healthcare provider when and how often to test your blood sugar. You may need to check your blood sugar level at least 3 times each day.     Talk to your healthcare provider about your weight.  Ask if you need to lose weight, and how much you need to lose. If you are overweight, you may need to make other changes to lose weight. Ask your healthcare provider to help you create a weight loss program.     Exercise  can help to control your blood sugar levels and decrease your risk of heart disease. It can also help you lose or maintain your weight. Get at least 30 minutes of exercise, 5 times each week. Do resistance training (using weights) 2 times each week. Do not sit for longer than 90 minutes. Work with your healthcare provider to plan the best exercise program for you.  Contact your healthcare provider if:   You have high blood sugar levels during a certain time of day, or almost all of the time.    You often have low blood sugar levels.    You have questions or concerns about your condition or care.  © 2017 [employer] Information is for End User's use only and may not be sold, redistributed or otherwise used for commercial purposes. All illustrations and images included in CareNotes® are the copyrighted property of [employer] or [employer]  The above  information is an  only. It is not intended as medical advice for individual conditions or treatments. Talk to your doctor, nurse or pharmacist before following any medical regimen to see if it is safe and effective for you.    Hemoglobin A1c   WHAT YOU NEED TO KNOW:   What is a hemoglobin A1c test, and why do I need one?  A hemoglobin A1c is a blood test that measures your average blood sugar level for the past 2 to 3 months. It is also called an HbA1c or glycohemoglobin test. An A1c test can help diagnose prediabetes or diabetes. It can also tell you how well your diabetes plan is working. A1c testing can help your healthcare provider make changes to your treatment plan. These changes can help improve or control your blood sugar levels. Good control of your blood sugar levels can decrease your risk for problems caused by diabetes. Examples include heart attack, stroke, blindness, kidney disease, and neuropathy (nerve problems).   What increases my risk for diabetes?  You may need an A1c test if you have any of the following risk factors for diabetes:  Heart disease    High blood pressure    Polycystic ovarian syndrome    A first-degree relative with diabetes, such as a parent, brother, sister, or child    Being overweight    Lack of exercise or activity     History of gestational diabetes and poor nutrition while pregnant  Who should get an A1c test?   Adults who are overweight and have 1 or more risk factors for diabetes    Adults 45 years of age or older    Children 10 to 18 years who are overweight and have 2 or more risk factors for diabetes    Anyone with signs or symptoms of diabetes, such as increased thirst, slow-healing infections, or increased urination  What do the results of an A1c test mean?  The results are given in percentages.  An A1c of 5.6% or lower means you do not have diabetes.     An A1c of 5.7% to 6.4% means you are at risk for diabetes. This is also called prediabetes.     An  A1c of 6.5% or higher means you have diabetes.     If you currently have diabetes in good control, your A1c goal may be 7% or lower. Your healthcare provider will decide what your goal should be. This decision is based on your diabetes history and other medical conditions. You may need an A1c test 2 to 4 times each year, depending on your blood sugar level control.  How should I prepare for the test?  You do not need to do anything to prepare for the test. Wear a short-sleeved or loose shirt to the test. This will make it easier to draw your blood. Other tests may be needed to diagnose or monitor diabetes if you have certain conditions. Tell your healthcare provider if you have any of the following:  Iron-deficiency or [id number] anemia    Cystic fibrosis    Recent heavy blood loss or a blood transfusion    Recent erythropoietin therapy    Sickle cell disease or thalassemia    Kidney failure or liver disease  What will happen after the test?  Schedule a follow-up appointment with your healthcare provider to talk about your test results.  If your A1c is lower than your goal , your medicines may be changed.     If your A1c is at your goal , you may not need any changes to your diabetes treatment plan.     If your A1c is higher than your goal , you may need changes to your medicines, eating plan, or exercise plan.  What else should I know about an A1c test?   You may get an estimated Average Glucose (eAG) with your A1c results. The eAG gives your A1c result in numbers like you see on your glucose meter. For example, an A1c of 6% will be reported as an eAG of 126 mg/dL. This means your average blood sugar level was 126 mg/dL over the last 2 to 3 months. The eAG can help you understand if your A1c result is on target for what your healthcare provider recommends.     You are at risk for an uncommon type of hemoglobin if you are , Mediterranean, or [place] . This type of hemoglobin can affect your A1c  test results. Tell your healthcare provider if you come from any of these backgrounds. You may need to have your A1c sent to a lab with certain equipment. This will help make sure your results are accurate.  CARE AGREEMENT:   You have the right to help plan your care. To help with this plan, you must learn about your lab tests. You can then discuss the results with your caregivers. Work with them to decide what care may be used to treat you. You always have the right to refuse treatment. The above information is an  only. It is not intended as medical advice for individual conditions or treatments. Talk to your doctor, nurse or pharmacist before following any medical regimen to see if it is safe and effective for you.  © 2017 [employer] Information is for End User's use only and may not be sold, redistributed or otherwise used for commercial purposes. All illustrations and images included in CareNotes® are the copyrighted property of [employer]. or [employer].         CARBOHYDRATES  As a carbohydrate is digested through our bodies it becomes a sugar.      There are TWO main things I want you to know about CARBOHYDRATES:     1. NUMBER     How many carbohydrates are in each serving of food you are about to eat matters to weight loss/gain, diabetes control, and sugar levels.  A food product is in the LOW carbohydrate level if it has less than 15grams carbohydrates per serving.  These are always good choices in general for a snack.  A meal can include anywhere from 15-45 grams carbohydrates in the meal.      TIP: Sugar Free foods contain carbohydrates which become sugar in the body.  If you are going to consider eating sugar free foods, you MUST follow serving size carefully.  These foods will still result in high glucose levels.      2. TYPE     The type of carbohydrate is VERY important.  A slice of bread (white or wheat) will have same amount of carbohydrates but both  break down at different paces in the body.  Carbohydrates that are more complex like Rye, whole wheat, and multi grain process slower through our bodies, therefore, making sugar levels rise slowly and steady and over long period of time.  WHITE carbohydrates such as white bread, white pasta and white rice digest quickly in your body and raise your glucose levels fast just like eating a candy bar.     TIP: Read the INGREDIENTS on each item to make sure the first ingredient is WHOLE WHEAT or Rye  (not enriched white flour) rather than reading front label of the product in order to verify whole wheat product.             Simple carbohydrates digest through your body QUICKLY causing the conversion of carbohydrates to become sugars if you are not using them immediately for energy.  Complex carbohydrates will become sugars SLOWLY over time as your body breaks them down in your digestive system.  Below is a graph demonstrating these two concepts.  The rate of sugar breakdown can easily affect your energy, metabolism, ability to gain or lose weight and more.  The goal is a slow and steady release of sugar into your body and to avoid sugar spikes in order to feel your best and manage your health.        Simple Carbohydrates: Candy bar, white bread, white pasta, white rice, white flour, cookies, sweets, cakes, corn, string beans etc               A little information about carbs ...   ·         Try for a low carbohydrate diet. This means less than 100 grams per day.   ·         Try to get a high amount of protein per day - shoot for 60-70 grams per day.  ·         To really lose weight you may need to try an ultra low carb diet - this means 10-15 grams per meal. And 5 - 10 grams per snack. This is usually under the supervision of a physician weight loss program.   ·         Carbohydrates are in starches and turn to sugars. Lower your intake of bread, pasta, potatoes, rice. Never drink your carbs - switch to water. No juice or  soda.   ·         When you do consume carbs, try for high fiber choices like fruits and veggies. Try for complex carbohydrates found in oats and whole grains. A daily fiber supplement can also be helpful.      Here is a lot more information …     Understanding Carbohydrates  A car needs the right type of fuel to run. And you need the right kind of food to function. To keep your energy level up, your body needs food that has carbohydrates. But carbohydrates raise blood sugar levels higher and faster than other kinds of food.      Starches - AVOID   Starches are found in grains, some vegetables, and beans. Grain products include bread, pasta, cereal, and tortillas. Starchy vegetables include potatoes, peas, corn, lima beans, yams, and squash. Kidney beans, ramirez beans, black beans, garbanzo beans, and lentils also contain starches.  Sugars - AVOID  Sugars are found naturally in many foods. Or sugar can be added. Foods that contain natural sugar include fruits and fruit juices, dairy products, honey, and molasses. Added sugars are found in most desserts, processed foods, candy, regular soda, and fruit drinks. These are very helpful for treating low blood sugar, or hypoglycemia. They provide sugar quickly.  Fiber - A BETTER CHOICE  Fiber comes from plant foods. Most fiber isn’t digested by the body. Instead of raising blood sugar levels like other carbohydrates, it actually stops blood sugar from rising too fast. Fiber is found in fruits, vegetables, whole grains, beans, peas, and many nuts.  Carb counting  It’s important to keep track of the amount of carbohydrates you eat. This can help you keep the right balance of physical activity and medicine. The amount of carbohydrates needed will vary for each person. It depends on many things such as your health, the medicines you take, and how active you are. You may start with around 45 to 60 grams of carbohydrate per meal, depending on your situation. Counting your  carbohydrates is a good way to control how many you eat. You can do this by keeping a food diary. There are great apps to help with this too like My Fitness Pal.      Carbohydrates come from a variety of foods. These include grains, starchy vegetables, fruit, milk, beans, and snack foods. You can either count carbohydrate grams or carbohydrate servings. When you count carbohydrate servings, 1 carbohydrate serving = 15 grams of carbohydrates.     Here are some examples of foods containing about 15 grams of carbohydrates (1 serving of carbohydrates):  ·      1/2 cup of canned or frozen fruit  ·      A small piece of fresh fruit (4 ounces)  ·      1 slice of bread  ·      1/2 cup of oatmeal  ·      1/3 cup of rice  ·      4 to 6 crackers  ·      1/2 English muffin  ·      1/2 cup of black beans  ·      1/4 of a large baked potato (3 ounces)  ·      2/3 cup of plain fat-free yogurt  ·      1 cup of soup  ·      1/2 cup of casserole  ·      6 chicken nuggets  ·      2-inch square brownie or cake without frosting  ·      2 small cookies  ·      1/2 cup of ice cream or sherbet     Carb counting is easier when food labels are available. Look at the label to see how many grams of total carbohydrates the food contains. Then you can figure out how much you should eat.  It’s also important to be consistent with the amount and time you eat when taking a fixed dose of diabetes medicine.     Make your meal plan  A meal plan gives guidelines for the types and amounts of food you should eat.  Watch serving sizes  Your meal plan will group foods by servings. To learn how much a serving is, start by measuring food portions at each meal. Soon you’ll know what a serving looks like on your plate. A quick rule is a serving size of meat is about the size of your fist.   Eat from all the food groups  The basis of a healthy meal plan is variety (eating lots of different foods). Choose lean meats, fresh fruits and vegetables, whole grains,  and low-fat or nonfat dairy products. Eating a wide variety of foods provides the nutrients your body needs. It can also keep you from getting bored with your meal plan.  Learn about carbohydrates, fats, and protein  ·      Carbohydrates are starches, sugars, and fiber. They are found in many foods, including fruit, bread, pasta, milk, and sweets. Of all the foods you eat, carbohydrates have the most effect on your blood sugar.   ·      Fats have the most calories. They also have the most effect on your weight and your risk of heart disease. It’s important to control your weight and protect your heart. Foods that are high in fat include whole milk, cheese, snack foods, and desserts.  ·      Protein is important for building and repairing muscles and bones. Choose low-fat protein sources, such as fish, egg whites, and skinless chicken.  Reduce liquid sugars  Extra calories from sodas, sports drinks, and fruit drinks make it hard to keep blood sugar in range. Cut as many liquid sugars from your meal plan as you can.  This includes most fruit juices, which are often high in natural or added sugar. Instead, drink plenty of water and other sugar-free beverages.  Eat less fat  If you need to lose weight, try to reduce the amount of fat in your diet. This can also help lower your cholesterol level to keep blood vessels healthier. Cut fat by using only small amounts of liquid oil for cooking. Read food labels carefully to avoid foods with unhealthy trans fats.  Timing your meals  When it comes to blood sugar control, when you eat is as important as what you eat. You may need to eat several small meals spaced evenly throughout the day to stay in your target range. So don’t skip breakfast or wait until late in the day to get most of your calories. Doing so can cause your blood sugar to rise too high or fall too low.         Understanding Carbohydrates, Fats, and Protein  Food is a source of fuel and nourishment for your body.  It’s also a source of pleasure. Having diabetes doesn’t mean you have to eat special foods or give up desserts. Instead, your dietitian can show you how to plan meals to suit your body. To start, learn how different foods affect blood sugar.     Carbohydrates  Carbohydrates are the main source of fuel for the body. Carbohydrates raise blood sugar. Many people think carbohydrates are only found in pasta or bread. But carbohydrates are actually in many kinds of foods:  ·      Sugars occur naturally in foods such as fruit, milk, honey, and molasses. Sugars can also be added to many foods, from cereals and yogurt to candy and desserts. Sugars raise blood sugar.  ·      Starches are found in bread, cereals, pasta, and dried beans. They’re also found in corn, peas, potatoes, yam, acorn squash, and butternut squash. Starches also raise blood sugar.   ·      Fiber is found in foods such as vegetables, fruits, beans, and whole grains. Unlike other carbs, fiber isn’t digested or absorbed. So it doesn’t raise blood sugar. In fact, fiber can help keep blood sugar from rising too fast. It also helps keep blood cholesterol at a healthy level.     Did you know?  Even though carbohydrates raise blood sugar, it’s best to have some in every meal. They are an important part of a healthy diet.      Fat  Fat is an energy source that can be stored until needed. Fat does not raise blood sugar. However, it can raise blood cholesterol, increasing the risk of heart disease. Fat is also high in calories, which can cause weight gain. Not all types of fat are the same.  More Healthy:  ·      Monounsaturated fats are mostly found in vegetable oils, such as olive, canola, and peanut oils. They are also found in avocados and some nuts. Monounsaturated fats are healthy for your heart. That’s because they lower LDL (unhealthy) cholesterol.  ·      Polyunsaturated fats are mostly found in vegetable oils, such as corn, safflower, and soybean oils.  They are also found in some seeds, nuts, and fish. Polyunsaturated fats lower LDL (unhealthy) cholesterol. So, choosing them instead of saturated fats is healthy for your heart. Certain unsaturated fats can help lower triglycerides.   Less Healthy:  ·      Saturated fats are found in animal products, such as meat, poultry, whole milk, lard, and butter. Saturated fats raise LDL cholesterol and are not healthy for your heart.  ·      Hydrogenated oils and trans fats are formed when vegetable oils are processed into solid fats. They are found in many processed foods. Hydrogenated oils and trans fats raise LDL cholesterol and lower HDL (healthy) cholesterol. They are not healthy for your heart.  Protein  Protein helps the body build and repair muscle and other tissue. Protein has little or no effect on blood sugar. However, many foods that contain protein also contain saturated fat. By choosing low-fat protein sources, you can get the benefits of protein without the extra fat:  ·      Plant protein is found in dry beans and peas, nuts, and soy products, such as tofu and soymilk. These sources tend to be cholesterol-free and low in saturated fat.  ·      Animal protein is found in fish, poultry, meat, cheese, milk, and eggs. These contain cholesterol and can be high in saturated fat. Aim for lean, lower-fat choices.     Reading food labels  Pay close attention to food labels. The information on them will help you choose healthy foods that make managing your blood sugar easier. Look for the Nutrition Facts label on packaged foods. This label tells you how many carbohydrates and how much sugar, fat, and fiber are in each serving. Then you can decide whether the food fits your meal plan.      Reading food labels helps you keep track of your carbohydrate intake. Remember to pay attention to serving sizes. If you eat this entire box, he will have eaten 34 grams of carbohydrate.   ·      Serving size: This number is very  important and tells you how much of the food makes up a single serving. If you eat more than one serving, all other numbers, like calories and carbohydrates, also increase.  ·      Total carbohydrates: This number tells you how much carbohydrate is in each serving. If you are carb counting, this number will help you fit the food into your meal plan. Also, keep in mind the number of servings you eat. If  you eat two servings, you’ll need to double the amount of carbohydrates listed on the box.   ·      Sugars: This number includes both natural and added sugars. Sugars count as part of your carbohydrate intake, and are included in the total carbohydrate number on the label.   ·      Fat: This number tells you the total amount of fat in each serving. Watch out for saturated fats, which raise cholesterol. Limit fats, especially if you are trying to lose weight.  ·      Trans fat: This number tells you if the food includes trans fat. Liquid oils made into a solid fat, such as margarine, have a lot of trans fat. Trans fat is bad for the heart. Try to avoid foods containing trans fat.  ·      Dietary fiber: This number tells you how much of the carbohydrate in the food is fiber. Foods high in fiber are healthy. They also help keep blood sugar levels steady.  Learning portion sizes  Portion control is an important part of healthy eating. How much food you eat affects your blood sugar.  And until you learn what portion sizes look like, use measuring cups and spoons to be sure portions are accurate.     Adapted from:  © 1608-6815 [employer]. [address] [place] [address], [place] [address]. All rights reserved. This information is not intended as a substitute for professional medical care. Always follow your healthcare professional's instructions.

## 2024-01-30 NOTE — TELEPHONE ENCOUNTER
----- Message from Norma Murray sent at 1/30/2024  9:35 AM EST -----  Regarding: CRC  01/30/24 9:35 AM    Hello, our patient Deb Flowers has had CRC: Colonoscopy completed/performed. Please assist in updating the patient chart by pulling a previous Electronic Medical Record (EMR) document. The previous EMR is [facility]. The date of service is 05/09/2019    Thank you,  Norma Murray  [facility] PRIMARY [facility]ARE

## 2024-01-30 NOTE — PROGRESS NOTES
Assessment/Plan:    Problem List Items Addressed This Visit     Cigarette smoker    History of colon cancer    Deviated nasal septum    Relevant Orders    Ambulatory Referral to Otolaryngology    Eustachian tube dysfunction, bilateral    Relevant Orders    Ambulatory Referral to Otolaryngology    Chronic pansinusitis    Relevant Orders    Ambulatory Referral to Otolaryngology    Prediabetes    Relevant Orders    Hemoglobin A1C   Other Visit Diagnoses     Encounter to establish care    -  Primary    Need for hepatitis C screening test        Relevant Orders    Hepatitis C Antibody    Screening for HIV (human immunodeficiency virus)        Relevant Orders    HIV 1/2 AG/AB w Reflex [facility] for 2 yr old and above    Encounter for immunization        Screening for cervical cancer        Encounter for screening mammogram for breast cancer        Breast cancer screening by mammogram        Relevant Orders    Mammo screening bilateral w 3d & cad    Screening for thyroid disorder        Relevant Orders    TSH, 3rd generation with Free T4 reflex    Screening for deficiency anemia        Relevant Orders    CBC and differential    Screening for lipid disorders        Relevant Orders    Lipid Panel with Direct LDL reflex    Screening for diabetes mellitus        Relevant Orders    Comprehensive metabolic panel    Depression screening negative        Consolidation of right upper lobe of lung (HCC)        right upper lung periphery consistent with mass lesion, 5.6 x 6.2 x 5.7 cm    Relevant Orders    Ambulatory Referral to Pulmonology    Well woman exam        Relevant Orders    Ambulatory Referral to Obstetrics / Gynecology           Diagnoses and all orders for this visit:    Encounter to establish care    Need for hepatitis C screening test  -     Hepatitis C Antibody; Future    Screening for HIV (human immunodeficiency virus)  -     HIV 1/2 AG/AB w Reflex [facility] for 2 yr old and above; Future    Encounter for  immunization    Screening for cervical cancer    Encounter for screening mammogram for breast cancer    Smoker    History of colon cancer    Chronic pansinusitis  -     Ambulatory Referral to Otolaryngology; Future    Eustachian tube dysfunction, bilateral  -     Ambulatory Referral to Otolaryngology; Future    Deviated nasal septum  -     Ambulatory Referral to Otolaryngology; Future    Breast cancer screening by mammogram  -     Mammo screening bilateral w 3d & cad; Future    Screening for thyroid disorder  -     TSH, 3rd generation with Free T4 reflex; Future    Screening for deficiency anemia  -     CBC and differential; Future    Screening for lipid disorders  -     Lipid Panel with Direct LDL reflex; Future    Screening for diabetes mellitus  -     Comprehensive metabolic panel; Future    Depression screening negative    Consolidation of right upper lobe of lung (HCC)  Comments:  right upper lung periphery consistent with mass lesion, 5.6 x 6.2 x 5.7 cm  Orders:  -     Ambulatory Referral to Pulmonology; Future    Well woman exam  -     Ambulatory Referral to Obstetrics / Gynecology; Future    Prediabetes  -     Hemoglobin A1C; Future    Other orders  -     naproxen sodium (ALEVE) 220 MG tablet; Take 220 mg by mouth every 12 (twelve) hours as needed        No problem-specific Assessment & Plan notes found for this encounter.        Subjective:      Patient ID: Deb Flowers is a 53 y.o. female.    Patient presents with:  Establish Care: Estabishing care  Weight gain-didd quit smoking though  Over a year with sinus trouble-9 times on antibiotics, chest pain, hearing isn't clear.  Gets blood boils all over but inside of thigh & behind ears.  Doesn't feel like body is healing.    NP with PMHx cigarette smoker, deviated septum, colon cancer, chronic para sinusitis, hyperlipidemia, pre-DM, and glaucoma presents to establish care. Pt is requesting ENT consultation for perisinusitis and deviate septum.     Pt  "requesting consultation for Pulmonology on account of consolidation found in the RUL field measuring 5.6 x 6.2 x 5.7 cm. Pt is aware of finding and was advised prior to [facility] imaging that this was R/T a lung infection that \"will take time to resolve\". Pt states she also was ordered allergy testing however she is unaware of these findings as HCP has not advised of findings. Pt continues with oncologist in [place] for colon CA.     Diabetes  Diabetes type: Pre-DM. Her disease course has been stable. There are no hypoglycemic associated symptoms. There are no diabetic associated symptoms. There are no hypoglycemic complications. Symptoms are stable. There are no diabetic complications. Risk factors: Pre-DM. Current diabetic treatment includes diet. She is compliant with treatment some of the time. She is following a generally healthy diet.       The following portions of the patient's history were reviewed and updated as appropriate:   She has a past medical history of Cancer (HCC) (2005) and Glaucoma.,  does not have any pertinent problems on file.,   has a past surgical history that includes RIGHT COLON RESECTION and Liver surgery.,  family history is not on file.,   reports that she has quit smoking. Her smoking use included cigarettes. She has been exposed to tobacco smoke. She has never used smokeless tobacco. She reports that she does not currently use alcohol. She reports that she does not use drugs.,  is allergic to cetirizine, clarithromycin, iodinated contrast media, morphine, and other..  Current Outpatient Medications   Medication Sig Dispense Refill   • bimatoprost (LUMIGAN) 0.01 % ophthalmic drops 1 drop daily at bedtime     • cyclobenzaprine (FLEXERIL) 5 mg tablet Take 1 tablet (5 mg total) by mouth 3 (three) times a day as needed for muscle spasms 30 tablet 0   • rosuvastatin (CRESTOR) 10 MG tablet TAKE 1 TABLET BY MOUTH EVERY DAY AT NIGHT     • ergocalciferol (ERGOCALCIFEROL) 1.25 MG (12184 UT) capsule Take " "1 capsule by mouth once a week (Patient not taking: Reported on 1/30/2024)     • methylPREDNISolone 4 MG tablet therapy pack Use as directed on package (Patient not taking: Reported on 1/30/2024) 21 each 0   • montelukast (SINGULAIR) 10 mg tablet Take 10 mg by mouth (Patient not taking: Reported on 8/2/2023)     • naproxen sodium (ALEVE) 220 MG tablet Take 220 mg by mouth every 12 (twelve) hours as needed     • oxymetazoline (AFRIN) 0.05 % nasal spray 2 sprays by Each Nare route 2 (two) times a day for 3 days (Patient not taking: Reported on 1/30/2024) 30 mL 0     No current facility-administered medications for this visit.       Depression Screening and Follow-up Plan: Patient was screened for depression during today's encounter. They screened negative with a PHQ-2 score of 2.      Review of Systems   All other systems reviewed and are negative.        Objective:  Vitals:    01/30/24 0911   BP: 112/82   Pulse: 87   Temp: 97.8 °F (36.6 °C)   TempSrc: Tympanic   SpO2: 97%   Weight: 64.7 kg (142 lb 9.6 oz)   Height: 5' 4\" (1.626 m)     Body mass index is 24.48 kg/m².     Physical Exam  Vitals and nursing note reviewed.   Constitutional:       Appearance: Normal appearance. She is well-developed.   HENT:      Head: Normocephalic and atraumatic.      Right Ear: Tympanic membrane, ear canal and external ear normal.      Left Ear: Tympanic membrane, ear canal and external ear normal.      Nose: Nose normal.      Mouth/Throat:      Mouth: Mucous membranes are moist.      Pharynx: Uvula midline.   Eyes:      General: Lids are normal.      Conjunctiva/sclera: Conjunctivae normal.      Pupils: Pupils are equal, round, and reactive to light.   Neck:      Thyroid: No thyroid mass or thyromegaly.      Vascular: No JVD.      Trachea: Trachea and phonation normal.   Cardiovascular:      Rate and Rhythm: Normal rate and regular rhythm.      Pulses: Normal pulses.      Heart sounds: Normal heart sounds, S1 normal and S2 normal. No " "murmur heard.     No friction rub. No gallop.   Pulmonary:      Effort: Pulmonary effort is normal.      Breath sounds: Normal breath sounds.   Abdominal:      General: Bowel sounds are normal.      Palpations: Abdomen is soft.      Tenderness: There is no abdominal tenderness.   Genitourinary:     Comments: Deferred   Musculoskeletal:         General: Normal range of motion.      Cervical back: Full passive range of motion without pain, normal range of motion and neck supple.      Right lower leg: No edema.      Left lower leg: No edema.   Lymphadenopathy:      Head:      Right side of head: No submental, submandibular, tonsillar, preauricular, posterior auricular or occipital adenopathy.      Left side of head: No submental, submandibular, tonsillar, preauricular, posterior auricular or occipital adenopathy.      Cervical: No cervical adenopathy.   Skin:     General: Skin is warm and dry.      Capillary Refill: Capillary refill takes less than 2 seconds.   Neurological:      General: No focal deficit present.      Mental Status: She is alert and oriented to person, place, and time.      Cranial Nerves: No cranial nerve deficit.      Sensory: Sensation is intact.      Motor: Motor function is intact.      Coordination: Coordination is intact.      Gait: Gait is intact.      Deep Tendon Reflexes: Reflexes are normal and symmetric.   Psychiatric:         Attention and Perception: Attention and perception normal.         Mood and Affect: Mood and affect normal.         Speech: Speech normal.         Behavior: Behavior normal. Behavior is cooperative.         Thought Content: Thought content normal.         Cognition and Memory: Cognition normal.         Judgment: Judgment normal.           Portions of the record may have been created with voice recognition software. Occasional wrong word or \"sound a like\" substitutions may have occurred due to the inherent limitations of voice recognition software. Read the chart " carefully and recognize, using context, where substitutions have occurred. Contact me with any questions.

## 2024-01-30 NOTE — LETTER
Procedure Request Form: Colonoscopy      Date Requested: 24  Patient: Deb Flowers  Patient : 1970   Referring Provider: ADAIR Braswell        Date of Procedure ____________2019__________________       The above patient has informed us that they have completed their   most recent Colonoscopy at your facility. Please complete   this form and attach all corresponding procedure reports/results.    Comments __________________________________________________________  ____________________________________________________________________  ____________________________________________________________________  ____________________________________________________________________    Facility Completing Procedure _________________________________________    Form Completed By (print name) _______________________________________      Signature __________________________________________________________      These reports are needed for  compliance.    Please fax this completed form and a copy of the procedure report to our office located at [address] [place] [address] as soon as possible to Fax [phone] christopher Piedra: Phone [phone]    We thank you for your assistance in treating our mutual patient.

## 2024-01-30 NOTE — LETTER
Procedure Request Form: Colonoscopy      Date Requested: 02/15/24  Patient: Deb Flowers  Patient : 1970   Referring Provider: ADAIR Braswell        Date of Procedure ______________________________       The above patient has informed us that they have completed their   most recent Colonoscopy at your facility. Please complete   this form and attach all corresponding procedure reports/results.    Comments __________________________________________________________  ____________________________________________________________________  ____________________________________________________________________  ____________________________________________________________________    Facility Completing Procedure _________________________________________    Form Completed By (print name) _______________________________________      Signature __________________________________________________________      These reports are needed for  compliance.    Please fax this completed form and a copy of the procedure report to our office located at [address] [place] [address] as soon as possible to Fax [phone] christopher Piedra: Phone [phone]    We thank you for your assistance in treating our mutual patient.

## 2024-01-30 NOTE — LETTER
3RD REQUEST      Procedure Request Form: Colonoscopy      Date Requested: 24  Patient: Deb Flowers  Patient : 1970   Referring Provider: ADAIR Brawsell        Date of Procedure ______________________________       The above patient has informed us that they have completed their   most recent Colonoscopy at your facility. Please complete   this form and attach all corresponding procedure reports/results.    Comments __________________________________________________________  ____________________________________________________________________  ____________________________________________________________________  ____________________________________________________________________    Facility Completing Procedure _________________________________________    Form Completed By (print name) _______________________________________      Signature __________________________________________________________      These reports are needed for  compliance.    Please fax this completed form and a copy of the procedure report to our office located at [address] [place] [address] as soon as possible to Fax [phone] christopher Piedra: Phone [phone]    We thank you for your assistance in treating our mutual patient.

## 2024-02-01 NOTE — TELEPHONE ENCOUNTER
Upon review of the In Basket request and the patient's chart, initial outreach has been made via telephone call to facility. Please see Contacts section for details.     Thank you  Dorothea Weller

## 2024-02-01 NOTE — TELEPHONE ENCOUNTER
Upon review of the In Basket request and the patient's chart, initial outreach has been made via fax to facility. Please see Contacts section for details.     Thank you  Dorothea Weller

## 2024-02-29 ENCOUNTER — TELEPHONE (OUTPATIENT)
Dept: FAMILY MEDICINE CLINIC | Facility: CLINIC | Age: 54
End: 2024-02-29

## 2024-02-29 NOTE — TELEPHONE ENCOUNTER
Called Pt to remind about b/w for appt.  She told me she's been having ongoing issues with swelling in her hands, head sickness and some other symptoms and her oncologist stated that since it's not related to her cancer, she should pursue with you more in depth b/w or possibly a referral to Rheumatology for autoimmune disorders.    Her oncologist is willing to discuss with you, should you chose to.  Her name is Dr Annamaria Lemos [phone]. I advised Pt I will pass on the info to you, but my feeling is that you will discuss with her in person on Monday.  Pt will get your b/w done tomorrow.

## 2024-03-01 ENCOUNTER — TELEPHONE (OUTPATIENT)
Age: 54
End: 2024-03-01

## 2024-03-01 ENCOUNTER — APPOINTMENT (OUTPATIENT)
Dept: LAB | Facility: CLINIC | Age: 54
End: 2024-03-01
Payer: COMMERCIAL

## 2024-03-01 DIAGNOSIS — Z11.4 SCREENING FOR HIV (HUMAN IMMUNODEFICIENCY VIRUS): ICD-10-CM

## 2024-03-01 DIAGNOSIS — Z13.29 SCREENING FOR THYROID DISORDER: ICD-10-CM

## 2024-03-01 DIAGNOSIS — Z11.59 NEED FOR HEPATITIS C SCREENING TEST: ICD-10-CM

## 2024-03-01 DIAGNOSIS — Z13.0 SCREENING FOR DEFICIENCY ANEMIA: ICD-10-CM

## 2024-03-01 DIAGNOSIS — Z13.1 SCREENING FOR DIABETES MELLITUS: ICD-10-CM

## 2024-03-01 DIAGNOSIS — Z13.220 SCREENING FOR LIPID DISORDERS: ICD-10-CM

## 2024-03-01 LAB
ALBUMIN SERPL BCP-MCNC: 4.4 G/DL (ref 3.5–5)
ALP SERPL-CCNC: 62 U/L (ref 34–104)
ALT SERPL W P-5'-P-CCNC: 11 U/L (ref 7–52)
ANION GAP SERPL CALCULATED.3IONS-SCNC: 8 MMOL/L
AST SERPL W P-5'-P-CCNC: 14 U/L (ref 13–39)
BASOPHILS # BLD AUTO: 0.04 THOUSANDS/ÂΜL (ref 0–0.1)
BASOPHILS NFR BLD AUTO: 1 % (ref 0–1)
BILIRUB SERPL-MCNC: 0.46 MG/DL (ref 0.2–1)
BUN SERPL-MCNC: 23 MG/DL (ref 5–25)
CALCIUM SERPL-MCNC: 9.3 MG/DL (ref 8.4–10.2)
CHLORIDE SERPL-SCNC: 104 MMOL/L (ref 96–108)
CHOLEST SERPL-MCNC: 173 MG/DL
CO2 SERPL-SCNC: 28 MMOL/L (ref 21–32)
CREAT SERPL-MCNC: 0.73 MG/DL (ref 0.6–1.3)
EOSINOPHIL # BLD AUTO: 0.32 THOUSAND/ÂΜL (ref 0–0.61)
EOSINOPHIL NFR BLD AUTO: 5 % (ref 0–6)
ERYTHROCYTE [DISTWIDTH] IN BLOOD BY AUTOMATED COUNT: 13.2 % (ref 11.6–15.1)
GFR SERPL CREATININE-BSD FRML MDRD: 94 ML/MIN/1.73SQ M
GLUCOSE P FAST SERPL-MCNC: 111 MG/DL (ref 65–99)
HCT VFR BLD AUTO: 38.9 % (ref 34.8–46.1)
HCV AB SER QL: NORMAL
HDLC SERPL-MCNC: 57 MG/DL
HGB BLD-MCNC: 12.6 G/DL (ref 11.5–15.4)
HIV 1+2 AB+HIV1 P24 AG SERPL QL IA: NORMAL
HIV 2 AB SERPL QL IA: NORMAL
HIV1 AB SERPL QL IA: NORMAL
HIV1 P24 AG SERPL QL IA: NORMAL
IMM GRANULOCYTES # BLD AUTO: 0.03 THOUSAND/UL (ref 0–0.2)
IMM GRANULOCYTES NFR BLD AUTO: 1 % (ref 0–2)
LDLC SERPL CALC-MCNC: 88 MG/DL (ref 0–100)
LYMPHOCYTES # BLD AUTO: 1.61 THOUSANDS/ÂΜL (ref 0.6–4.47)
LYMPHOCYTES NFR BLD AUTO: 26 % (ref 14–44)
MCH RBC QN AUTO: 32.8 PG (ref 26.8–34.3)
MCHC RBC AUTO-ENTMCNC: 32.4 G/DL (ref 31.4–37.4)
MCV RBC AUTO: 101 FL (ref 82–98)
MONOCYTES # BLD AUTO: 0.43 THOUSAND/ÂΜL (ref 0.17–1.22)
MONOCYTES NFR BLD AUTO: 7 % (ref 4–12)
NEUTROPHILS # BLD AUTO: 3.89 THOUSANDS/ÂΜL (ref 1.85–7.62)
NEUTS SEG NFR BLD AUTO: 60 % (ref 43–75)
NRBC BLD AUTO-RTO: 0 /100 WBCS
PLATELET # BLD AUTO: 294 THOUSANDS/UL (ref 149–390)
PMV BLD AUTO: 10.2 FL (ref 8.9–12.7)
POTASSIUM SERPL-SCNC: 4.3 MMOL/L (ref 3.5–5.3)
PROT SERPL-MCNC: 6.9 G/DL (ref 6.4–8.4)
RBC # BLD AUTO: 3.84 MILLION/UL (ref 3.81–5.12)
SODIUM SERPL-SCNC: 140 MMOL/L (ref 135–147)
T4 FREE SERPL-MCNC: 0.43 NG/DL (ref 0.61–1.12)
TRIGL SERPL-MCNC: 139 MG/DL
TSH SERPL DL<=0.05 MIU/L-ACNC: 11.18 UIU/ML (ref 0.45–4.5)
WBC # BLD AUTO: 6.32 THOUSAND/UL (ref 4.31–10.16)

## 2024-03-01 PROCEDURE — 80053 COMPREHEN METABOLIC PANEL: CPT

## 2024-03-01 PROCEDURE — 86803 HEPATITIS C AB TEST: CPT

## 2024-03-01 PROCEDURE — 80061 LIPID PANEL: CPT

## 2024-03-01 PROCEDURE — 84439 ASSAY OF FREE THYROXINE: CPT

## 2024-03-01 PROCEDURE — 84443 ASSAY THYROID STIM HORMONE: CPT

## 2024-03-01 PROCEDURE — 85025 COMPLETE CBC W/AUTO DIFF WBC: CPT

## 2024-03-01 PROCEDURE — 87389 HIV-1 AG W/HIV-1&-2 AB AG IA: CPT

## 2024-03-01 PROCEDURE — 36415 COLL VENOUS BLD VENIPUNCTURE: CPT

## 2024-03-04 ENCOUNTER — CONSULT (OUTPATIENT)
Dept: PULMONOLOGY | Facility: CLINIC | Age: 54
End: 2024-03-04
Payer: COMMERCIAL

## 2024-03-04 ENCOUNTER — OFFICE VISIT (OUTPATIENT)
Dept: FAMILY MEDICINE CLINIC | Facility: CLINIC | Age: 54
End: 2024-03-04
Payer: COMMERCIAL

## 2024-03-04 VITALS
DIASTOLIC BLOOD PRESSURE: 70 MMHG | HEART RATE: 94 BPM | BODY MASS INDEX: 23.9 KG/M2 | OXYGEN SATURATION: 97 % | HEIGHT: 64 IN | WEIGHT: 140 LBS | SYSTOLIC BLOOD PRESSURE: 104 MMHG | TEMPERATURE: 98.6 F

## 2024-03-04 VITALS
HEART RATE: 91 BPM | SYSTOLIC BLOOD PRESSURE: 124 MMHG | OXYGEN SATURATION: 98 % | TEMPERATURE: 97.9 F | DIASTOLIC BLOOD PRESSURE: 68 MMHG

## 2024-03-04 DIAGNOSIS — Z11.4 SCREENING FOR HIV (HUMAN IMMUNODEFICIENCY VIRUS): Primary | ICD-10-CM

## 2024-03-04 DIAGNOSIS — J32.9 CHRONIC SINUSITIS, UNSPECIFIED LOCATION: Primary | ICD-10-CM

## 2024-03-04 DIAGNOSIS — J18.1 CONSOLIDATION OF RIGHT UPPER LOBE OF LUNG (HCC): ICD-10-CM

## 2024-03-04 DIAGNOSIS — Z23 ENCOUNTER FOR VACCINATION: ICD-10-CM

## 2024-03-04 DIAGNOSIS — E03.9 ACQUIRED HYPOTHYROIDISM: ICD-10-CM

## 2024-03-04 DIAGNOSIS — Z23 ENCOUNTER FOR IMMUNIZATION: ICD-10-CM

## 2024-03-04 PROCEDURE — 99204 OFFICE O/P NEW MOD 45 MIN: CPT | Performed by: INTERNAL MEDICINE

## 2024-03-04 PROCEDURE — 90471 IMMUNIZATION ADMIN: CPT

## 2024-03-04 PROCEDURE — 99396 PREV VISIT EST AGE 40-64: CPT | Performed by: NURSE PRACTITIONER

## 2024-03-04 PROCEDURE — 90686 IIV4 VACC NO PRSV 0.5 ML IM: CPT

## 2024-03-04 RX ORDER — LEVOTHYROXINE SODIUM 0.05 MG/1
50 TABLET ORAL
Qty: 90 TABLET | Refills: 0 | Status: SHIPPED | OUTPATIENT
Start: 2024-03-04

## 2024-03-04 NOTE — PROGRESS NOTES
Pulmonary Consultation   Deb Flowers 53 y.o. female MRN: [id number]  3/4/2024      Reason for Consultation:  Lung mass     Requested by:  ADAIR Myrick    Assessment/Plan:  Cavitary consolidation of right upper lobe of lung   Patient with recent diagnosis of cavitary lung lesion of the right upper lobe noted after episode of hemoptysis in August 2023.  Patient has since been treated with approximately 2 weeks of antibiotics with resolution of cavitary lesion noted on most recent CT scan completed 2/21/2024.  Unfortunately we do not have the images, however the patient shared the reads as documented in the HPI.  Patient is currently feeling well aside from chronic sinusitis issues.  Unclear etiology of cavitary lung lesion, however given history of recurrent URIs and chronic sinusitis, this is the most likely cause.  Patient had recent testing including HIV and immunoglobulins which were within normal limits aside from a mild elevated IgE.  She has no other known risk factors.  -Will obtain CT images  - Will continue to monitor symptoms at this time.  He given improvement of cavitary lesion with treatment of antibiotics, will not plan to repeat CT imaging outside of her regular cancer surveillance    Chronic sinusitis  Patient with history of chronic sinusitis and deviated nasal septum followed by ENT with [facility].  Patient with persistent sinus congestion, postnasal drip, and cough.  Patient requesting referral to [facility] provider at this time  - Allergies to bermuda grass, cockroach, and dust mites  -Highest eos 390  - Referral to ENT placed    Follow up as needed     History of Present Illness   HPI:  Deb Flowers is a 53 y.o. female with history of metastatic colon cancer to the liver s/p resection and adjuvant chemotherapy now in remission since 2010, hypothyroidism, HLD, chronic sinusitis who presents for evaluation of lung mass.    Patient states that she has struggled with URI for years and is followed by  "ENT. In August 2023, patient had episode of hemoptysis for 1-2 days. Hemoptysis described as a mouth full of \"pure blood\". She reported to [facility] and had a ct chest completed on 8/11/2023 which showed a new cavitary RUL mass measuring 5.6 x 6.2 x 5.7 cm. She then went to see her oncologist at [facility] who repeated the CT chest on 8/18/2023 which showed right lung mass consistent w/ abscess- upper lob 6.0x5.6 cm surrounding ground glass opacification consistent w/ an intrapulmonary abscess, new mild left lower lobe atelectasis. She was started on antibiotics, she thought was amoxicillin. Repeat CT chest was completed on 9/1/2023 which showed posterolateral right upper lobe abscess decreased in size, 3.8 x 2.8 cm. She was prescribed additional antibiotic (same abx, additional week supply). She believes she took a total of 14 days of antibiotic therapy. She had a repeat scan on 2/21/2024 residual probable plate-like atelectasis and scarring s/p resolution of air containing abscess cavity RUL w/o suspicious nodule.  Patient otherwise denied fevers, chills, SOB, hemoptysis, CP, weight loss, night sweats.     Since, patient has been feeling relatively well from a pulmonary stand point. She did quit smoking in 8/2023. She as a 38 PY history. She denies shortness of breath, but continues to report cough and mucus production especially in the mornings. She believes this is all secondary to sinus congestion and post nasal drip. She follows with ENT through [facility], but is looking to transfer her care to [facility]. She is a/p ESS, septoplasty, SMRT in 4/26/2023. She has been maintained on intranasal steroids without improvement in her symptoms. She had recent allergy and immunoglobulin testing completed. Immunoglobulins were wnl aside from elevation in IGE. Allergies to bermuda grass, cockroach, and dust mites. HIV was negative. 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 No recent travel or known sick contacts. " "    Review of systems:  12 point review of systems was completed and was otherwise negative except as listed in HPI.    Historical Information   Past Medical History:   Diagnosis Date    Cancer (HCC) 2005    Colon CA stage 4    Glaucoma      Past Surgical History:   Procedure Laterality Date    LIVER SURGERY      RIGHT COLON RESECTION       History reviewed. No pertinent family history.    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       Meds/Allergies     Current Outpatient Medications:     bimatoprost (LUMIGAN) 0.01 % ophthalmic drops, 1 drop daily at bedtime, Disp: , Rfl:     cyclobenzaprine (FLEXERIL) 5 mg tablet, Take 1 tablet (5 mg total) by mouth 3 (three) times a day as needed for muscle spasms, Disp: 30 tablet, Rfl: 0    levothyroxine (Euthyrox) 50 mcg tablet, Take 1 tablet (50 mcg total) by mouth daily in the early morning, Disp: 90 tablet, Rfl: 0    naproxen sodium (ALEVE) 220 MG tablet, Take 220 mg by mouth every 12 (twelve) hours as needed, Disp: , Rfl:     rosuvastatin (CRESTOR) 10 MG tablet, TAKE 1 TABLET BY MOUTH EVERY DAY AT NIGHT, Disp: , Rfl:   Allergies   Allergen Reactions    Cetirizine GI Intolerance     Abdominal pain    Clarithromycin      Other reaction(s): GI Upset    Iodinated Contrast Media     Morphine      Other reaction(s): GI Upset    Other Hives       Vitals: Blood pressure 124/68, pulse 91, temperature 97.9 °F (36.6 °C), temperature source Tympanic, SpO2 98%., There is no height or weight on file to calculate BMI. Oxygen Therapy  SpO2: 98 %  Oxygen Therapy: None (Room air)    Physical Exam  General: No acute distress  HENT: Normocephalic, atraumatic.  PERRL, EOMI, Moist mucous membranes.  Neck: Supple  Lungs: Clear to auscultation bilaterally, no wheezes, rales, rhonchi.  On " "room air  Extremities: Warm and well perfused, no cyanosis, clubbing, or edema  Skin: Warm, dry, no rashes or lesions  Neurologic: No focal neurologic deficits     Labs: I have personally reviewed pertinent lab results.  Lab Results   Component Value Date    WBC 6.32 03/01/2024    HGB 12.6 03/01/2024    HCT 38.9 03/01/2024     (H) 03/01/2024     03/01/2024     Lab Results   Component Value Date    CALCIUM 9.3 03/01/2024    K 4.3 03/01/2024    CO2 28 03/01/2024     03/01/2024    BUN 23 03/01/2024    CREATININE 0.73 03/01/2024     Lab Results   Component Value Date     (H) 09/14/2023     Lab Results   Component Value Date    ALT 11 03/01/2024    AST 14 03/01/2024    ALKPHOS 62 03/01/2024       Imaging and other studies: I have personally reviewed pertinent reports.    As per HPI       Pulmonary function testing: No prior testing      EKG, Pathology, and Other Studies: I have personally reviewed pertinent reports.        Disclaimer: Portions of the record may have been created with voice recognition software. Occasional wrong word or \"sound a like\" substitutions may have occurred due to the inherent limitations of voice recognition software. Careful consideration should be taken to recognize, using context, where substitutions have occurred.    Sterling Harrell DO   Pulmonary & Critical Care Medicine Fellow PGY-IV  [facility]  "

## 2024-03-04 NOTE — PROGRESS NOTES
ADULT ANNUAL PHYSICAL  [facility] CARE    NAME: Deb Flowers  AGE: 53 y.o. SEX: female  : 1970     DATE: 3/4/2024     Assessment and Plan:     Problem List Items Addressed This Visit    None  Visit Diagnoses     Screening for HIV (human immunodeficiency virus)    -  Primary    Encounter for immunization        Acquired hypothyroidism        Relevant Medications    levothyroxine (Euthyrox) 50 mcg tablet    Other Relevant Orders    TSH, 3rd generation    T4, free    Encounter for vaccination        Relevant Orders    influenza vaccine, quadrivalent, 0.5 mL, preservative-free, for adult and pediatric patients 6 mos+ (AFLURIA, FLUARIX, FLULAVAL, FLUZONE) (Completed)          Immunizations and preventive care screenings were discussed with patient today. Appropriate education was printed on patient's after visit summary.    Counseling:  Alcohol/drug use: discussed moderation in alcohol intake, the recommendations for healthy alcohol use, and avoidance of illicit drug use.  Dental Health: discussed importance of regular tooth brushing, flossing, and dental visits.  Injury prevention: discussed safety/seat belts, safety helmets, smoke detectors, carbon dioxide detectors, and smoking near bedding or upholstery.  Sexual health: discussed sexually transmitted diseases, partner selection, use of condoms, avoidance of unintended pregnancy, and contraceptive alternatives.  Exercise: the importance of regular exercise/physical activity was discussed. Recommend exercise 3-5 times per week for at least 30 minutes.          Return in about 9 weeks (around 2024).     Chief Complaint:     Chief Complaint   Patient presents with   • Annual Exam      History of Present Illness:     Adult Annual Physical   Patient here for a comprehensive physical exam. The patient reports problems - hand swelling, weight gain noted since August .    Patient presents with:  Annual  Exam    Thyroid Problem  Presents for initial visit. Symptoms include weight gain. (Swelling hands and feet) Past treatments include nothing. Risk factors include family history of hypothyroidism.       Diet and Physical Activity  Diet/Nutrition: well balanced diet.   Exercise:  labor intensive job .      Depression Screening  PHQ-2/9 Depression Screening    Little interest or pleasure in doing things: 0 - not at all  Feeling down, depressed, or hopeless: 0 - not at all  Trouble falling or staying asleep, or sleeping too much: 0 - not at all  Feeling tired or having little energy: 0 - not at all  Poor appetite or overeatin - not at all  Feeling bad about yourself - or that you are a failure or have let yourself or your family down: 0 - not at all  Trouble concentrating on things, such as reading the newspaper or watching television: 0 - not at all  Moving or speaking so slowly that other people could have noticed. Or the opposite - being so fidgety or restless that you have been moving around a lot more than usual: 0 - not at all  Thoughts that you would be better off dead, or of hurting yourself in some way: 0 - not at all  PHQ-2 Score: 0  PHQ-2 Interpretation: Negative depression screen  PHQ-9 Score: 0  PHQ-9 Interpretation: No or Minimal depression       General Health  Sleep: sleeps well.   Hearing: normal - bilateral.  Vision: no vision problems.     /GYN Health  Follows with gynecology? NP    Patient is: postmenopausal     Review of Systems:     Review of Systems   Constitutional:  Positive for weight gain.   Musculoskeletal:         Swelling and achy hands and feet    All other systems reviewed and are negative.     Past Medical History:     Past Medical History:   Diagnosis Date   • Cancer (HCC)     Colon CA stage 4   • Glaucoma       Past Surgical History:     Past Surgical History:   Procedure Laterality Date   • LIVER SURGERY     • RIGHT COLON RESECTION        Social History:     Social  "History     Socioeconomic History   • Marital status: /Civil Union     Spouse name: None   • Number of children: None   • Years of education: None   • Highest education level: None   Occupational History   • None   Tobacco Use   • Smoking status: Former     Types: Cigarettes     Passive exposure: Current   • Smokeless tobacco: Never   • Tobacco comments:     Quit \"August 2023\".   Vaping Use   • Vaping status: Never Used   Substance and Sexual Activity   • Alcohol use: Not Currently   • Drug use: Never   • Sexual activity: None   Other Topics Concern   • None   Social History Narrative   • None     Social Determinants of Health     Financial Resource Strain: Not on file   Food Insecurity: Not on file   Transportation Needs: Not on file   Physical Activity: Not on file   Stress: Not on file   Social Connections: Not on file   Intimate Partner Violence: Not on file   Housing Stability: Not on file      Family History:     History reviewed. No pertinent family history.   Current Medications:     Current Outpatient Medications   Medication Sig Dispense Refill   • bimatoprost (LUMIGAN) 0.01 % ophthalmic drops 1 drop daily at bedtime     • cyclobenzaprine (FLEXERIL) 5 mg tablet Take 1 tablet (5 mg total) by mouth 3 (three) times a day as needed for muscle spasms 30 tablet 0   • levothyroxine (Euthyrox) 50 mcg tablet Take 1 tablet (50 mcg total) by mouth daily in the early morning 90 tablet 0   • naproxen sodium (ALEVE) 220 MG tablet Take 220 mg by mouth every 12 (twelve) hours as needed     • rosuvastatin (CRESTOR) 10 MG tablet TAKE 1 TABLET BY MOUTH EVERY DAY AT NIGHT       No current facility-administered medications for this visit.      Allergies:     Allergies   Allergen Reactions   • Cetirizine GI Intolerance     Abdominal pain   • Clarithromycin      Other reaction(s): GI Upset   • Iodinated Contrast Media    • Morphine      Other reaction(s): GI Upset   • Other Hives      Physical Exam:     /70   Pulse " "94   Temp 98.6 °F (37 °C) (Tympanic)   Ht 5' 4\" (1.626 m)   Wt 63.5 kg (140 lb)   SpO2 97%   BMI 24.03 kg/m²     Physical Exam  Vitals and nursing note reviewed.   Constitutional:       Appearance: Normal appearance. She is well-developed.   HENT:      Head: Normocephalic and atraumatic.      Right Ear: Tympanic membrane, ear canal and external ear normal.      Left Ear: Tympanic membrane, ear canal and external ear normal.      Nose: Nose normal.      Mouth/Throat:      Mouth: Mucous membranes are moist.   Eyes:      General: Lids are normal. Vision grossly intact.      Conjunctiva/sclera: Conjunctivae normal.      Pupils: Pupils are equal, round, and reactive to light.   Cardiovascular:      Rate and Rhythm: Normal rate and regular rhythm.      Pulses: Normal pulses.      Heart sounds: Normal heart sounds, S1 normal and S2 normal.      No friction rub. No gallop. No S3 sounds.   Pulmonary:      Effort: Pulmonary effort is normal.      Breath sounds: Normal breath sounds.   Abdominal:      General: Bowel sounds are normal.      Palpations: Abdomen is soft.      Tenderness: There is no abdominal tenderness.   Genitourinary:     Comments: Deferred  Musculoskeletal:         General: Normal range of motion.      Cervical back: Normal range of motion and neck supple.      Right lower leg: No edema.      Left lower leg: No edema.   Lymphadenopathy:      Cervical: No cervical adenopathy.   Skin:     General: Skin is warm and dry.      Capillary Refill: Capillary refill takes less than 2 seconds.   Neurological:      General: No focal deficit present.      Mental Status: She is alert and oriented to person, place, and time.      Motor: Motor function is intact.      Gait: Gait is intact.   Psychiatric:         Attention and Perception: Attention and perception normal.         Mood and Affect: Mood and affect normal.         Speech: Speech normal.         Behavior: Behavior normal. Behavior is cooperative.         " Thought Content: Thought content normal.         Cognition and Memory: Cognition and memory normal.         Judgment: Judgment normal.          Appointment on 03/01/2024   Component Date Value Ref Range Status   • Cholesterol 03/01/2024 173  See Comment mg/dL Final    Cholesterol:         Pediatric <18 Years        Desirable          <170 mg/dL      Borderline High    170-199 mg/dL      High               >=200 mg/dL        Adult >=18 Years            Desirable         <200 mg/dL      Borderline High   200-239 mg/dL      High              >239 mg/dL     • Triglycerides 03/01/2024 139  See Comment mg/dL Final    Triglyceride:     0-9Y            <75mg/dL     10Y-17Y         <90 mg/dL       >=18Y     Normal          <150 mg/dL     Borderline High 150-199 mg/dL     High            200-499 mg/dL        Very High       >499 mg/dL    Specimen collection should occur prior to Metamizole administration due to the potential for falsely depressed results.   • HDL, Direct 03/01/2024 57  >=50 mg/dL Final   • LDL Calculated 03/01/2024 88  0 - 100 mg/dL Final    LDL Cholesterol:     Optimal           <100 mg/dl     Near Optimal      100-129 mg/dl     Above Optimal       Borderline High 130-159 mg/dl       High            160-189 mg/dl       Very High       >189 mg/dl         This screening LDL is a calculated result.   It does not have the accuracy of the Direct Measured LDL in the monitoring of patients with hyperlipidemia and/or statin therapy.   Direct Measure LDL (NOH521) must be ordered separately in these patients.   • WBC 03/01/2024 6.32  4.31 - 10.16 Thousand/uL Final   • RBC 03/01/2024 3.84  3.81 - 5.12 Million/uL Final   • Hemoglobin 03/01/2024 12.6  11.5 - 15.4 g/dL Final   • Hematocrit 03/01/2024 38.9  34.8 - 46.1 % Final   • MCV 03/01/2024 101 (H)  82 - 98 fL Final   • MCH 03/01/2024 32.8  26.8 - 34.3 pg Final   • MCHC 03/01/2024 32.4  31.4 - 37.4 g/dL Final   • RDW 03/01/2024 13.2  11.6 - 15.1 % Final   • MPV  03/01/2024 10.2  8.9 - 12.7 fL Final   • Platelets 03/01/2024 294  149 - 390 Thousands/uL Final   • nRBC 03/01/2024 0  /100 WBCs Final   • Neutrophils Relative 03/01/2024 60  43 - 75 % Final   • Immat GRANS % 03/01/2024 1  0 - 2 % Final   • Lymphocytes Relative 03/01/2024 26  14 - 44 % Final   • Monocytes Relative 03/01/2024 7  4 - 12 % Final   • Eosinophils Relative 03/01/2024 5  0 - 6 % Final   • Basophils Relative 03/01/2024 1  0 - 1 % Final   • Neutrophils Absolute 03/01/2024 3.89  1.85 - 7.62 Thousands/µL Final   • Immature Grans Absolute 03/01/2024 0.03  0.00 - 0.20 Thousand/uL Final   • Lymphocytes Absolute 03/01/2024 1.61  0.60 - 4.47 Thousands/µL Final   • Monocytes Absolute 03/01/2024 0.43  0.17 - 1.22 Thousand/µL Final   • Eosinophils Absolute 03/01/2024 0.32  0.00 - 0.61 Thousand/µL Final   • Basophils Absolute 03/01/2024 0.04  0.00 - 0.10 Thousands/µL Final   • Sodium 03/01/2024 140  135 - 147 mmol/L Final   • Potassium 03/01/2024 4.3  3.5 - 5.3 mmol/L Final   • Chloride 03/01/2024 104  96 - 108 mmol/L Final   • CO2 03/01/2024 28  21 - 32 mmol/L Final   • ANION GAP 03/01/2024 8  mmol/L Final   • BUN 03/01/2024 23  5 - 25 mg/dL Final   • Creatinine 03/01/2024 0.73  0.60 - 1.30 mg/dL Final    Standardized to IDMS reference method   • Glucose, Fasting 03/01/2024 111 (H)  65 - 99 mg/dL Final   • Calcium 03/01/2024 9.3  8.4 - 10.2 mg/dL Final   • AST 03/01/2024 14  13 - 39 U/L Final   • ALT 03/01/2024 11  7 - 52 U/L Final    Specimen collection should occur prior to Sulfasalazine administration due to the potential for falsely depressed results.    • Alkaline Phosphatase 03/01/2024 62  34 - 104 U/L Final   • Total Protein 03/01/2024 6.9  6.4 - 8.4 g/dL Final   • Albumin 03/01/2024 4.4  3.5 - 5.0 g/dL Final   • Total Bilirubin 03/01/2024 0.46  0.20 - 1.00 mg/dL Final    Use of this assay is not recommended for patients undergoing treatment with eltrombopag due to the potential for falsely elevated  results.  N-acetyl-p-benzoquinone imine (metabolite of Acetaminophen) will generate erroneously low results in samples for patients that have taken an overdose of Acetaminophen.   • eGFR 03/01/2024 94  ml/min/1.73sq m Final   • TSH 3RD GENERATON 03/01/2024 11.180 (H)  0.450 - 4.500 uIU/mL Final    The recommended reference ranges for TSH during pregnancy are as follows:   First trimester 0.100 to 2.500 uIU/mL   Second trimester  0.200 to 3.000 uIU/mL   Third trimester 0.300 to 3.000 uIU/m    Note: Normal ranges may not apply to patients who are transgender, non-binary, or whose legal sex, sex at birth, and gender identity differ.  Adult TSH (3rd generation) reference range follows the recommended guidelines of the American Thy[employer] Association, January, 2020.   • Hepatitis C Ab 03/01/2024 Non-reactive  Non-Reactive Final   • HIV-1 p24 Antigen 03/01/2024 Non-Reactive  Non-Reactive Final   • HIV-1 Antibody 03/01/2024 Non-Reactive  Non-Reactive Final   • HIV-2 Antibody 03/01/2024 Non-Reactive  Non-Reactive Final   • HIV Ag-Ab 5th Gen 03/01/2024 Non-Reactive  Non-Reactive Final    A Non-Reactive test result does not preclude the possibility of exposure or infection with HIV-1 and/or HIV-2.  Non-Reactive results can occur if the quantity of marker present is below the detection limits or is not present during the stage of disease in which a sample is collected. Repeat testing should be considered where there is clinical suspicion of infection.      • Free T4 03/01/2024 0.43 (L)  0.61 - 1.12 ng/dL Final    Specimens with biotin concentrations > 10 ng/mL can lead to significant (> 10%) positive bias in result.       I have reviewed all the lab results. There are some abnormalities that are not critical to the patient's health, I have discussed these in person at this office appointment.    ADAIR Braswell  [facility]

## 2024-04-08 ENCOUNTER — OFFICE VISIT (OUTPATIENT)
Dept: OBGYN CLINIC | Facility: CLINIC | Age: 54
End: 2024-04-08
Payer: COMMERCIAL

## 2024-04-08 VITALS
WEIGHT: 146 LBS | HEIGHT: 64 IN | DIASTOLIC BLOOD PRESSURE: 62 MMHG | SYSTOLIC BLOOD PRESSURE: 122 MMHG | BODY MASS INDEX: 24.92 KG/M2

## 2024-04-08 DIAGNOSIS — E55.9 VITAMIN D DEFICIENCY: ICD-10-CM

## 2024-04-08 DIAGNOSIS — L02.818 CUTANEOUS ABSCESS OF OTHER SITE: ICD-10-CM

## 2024-04-08 DIAGNOSIS — Z01.419 ENCOUNTER FOR GYNECOLOGICAL EXAMINATION WITHOUT ABNORMAL FINDING: Primary | ICD-10-CM

## 2024-04-08 DIAGNOSIS — Z85.038 HISTORY OF COLON CANCER: ICD-10-CM

## 2024-04-08 DIAGNOSIS — Z12.31 ENCOUNTER FOR SCREENING MAMMOGRAM FOR BREAST CANCER: ICD-10-CM

## 2024-04-08 DIAGNOSIS — Z01.419 WELL WOMAN EXAM: ICD-10-CM

## 2024-04-08 PROCEDURE — G0476 HPV COMBO ASSAY CA SCREEN: HCPCS | Performed by: ADVANCED PRACTICE MIDWIFE

## 2024-04-08 PROCEDURE — 99386 PREV VISIT NEW AGE 40-64: CPT | Performed by: ADVANCED PRACTICE MIDWIFE

## 2024-04-08 NOTE — PROGRESS NOTES
[facility] [address] Garth Mendoza PA    ASSESSMENT/PLAN: Deb Flowers is a 53 y.o.  who presents for annual gynecologic exam.    Encounter for routine gynecologic examination  - Routine well woman exam completed today.  - Cervical Cancer Screening: Current ASCCP Guidelines reviewed. Last Pap: Not on file . Next Pap Due: today  - HPV Vaccination status: Not immunized  - STI screening offered including HIV: not indicated based on hx or requested at time of visit  - Breast Cancer Screening: Last Mammogram Not on file, script given  - Colorectal cancer screening was not ordered.  - The following were reviewed in today's visit: breast self exam, mammography screening ordered, menopause, adequate intake of calcium and vitamin D, and exercise  - RTO 1 yr    Additional problems addressed at this visit:  1. Encounter for gynecological examination without abnormal finding  -     Liquid-based pap, screening  -     Mammo screening bilateral w 3d & cad; Future    2. History of colon cancer    3. Well woman exam  -     Ambulatory Referral to Obstetrics / Gynecology    4. Encounter for screening mammogram for breast cancer  -     Mammo screening bilateral w 3d & cad; Future    5. Vitamin D deficiency  -     Vitamin D 25 hydroxy; Future    6. Cutaneous abscess of other site  -     Ambulatory Referral to Dermatology; Future        CC:  Annual Gynecologic Examination    HPI: Deb Flowers is a 53 y.o.  who presents for annual gynecologic examination.  Deb presents today for gyn exam. 2023 last menses. Greater than 10 yrs last pap smear, Hx of abnormal pap smear no. Sexually active- no- pain due to rectal cancer - follows with surgeon. Tried having intercourse a few months ago, with partner for 35 yrs. Does not desire STI testing.   mammogram- script given. Hx of colon cancer-2005, colon resection.  Reports interrupted sleep pattern hrs of sleep daily- notes that it is related to  "multiple stressors, minimal servings of calcium rich foods daily. Exercises : notes pain in legs and coughing with activity, active daily. 2 servings of caffeine daily. Does not perform SBE. Safe at home- yes . Wears seatbelt - yes.  Concerns: hot flashes, sweats.  Does not desire medication. Notes recurrent boil in groin area- right side and on tailbone area. TSH is abnormal- started on medication.         The following portions of the patient's history were reviewed and updated as appropriate: allergies, current medications, past family history, past medical history, obstetric history, gynecologic history, past social history, past surgical history and problem list.    Review of Systems   Constitutional:  Positive for fatigue. Negative for chills and fever.   Respiratory:  Negative for cough and shortness of breath.         Allergy symptoms.    Cardiovascular:  Negative for chest pain, palpitations and leg swelling.   Gastrointestinal:  Positive for constipation. Negative for diarrhea.   Genitourinary:  Negative for difficulty urinating, dysuria, frequency, menstrual problem, pelvic pain, urgency, vaginal bleeding, vaginal discharge and vaginal pain.   Neurological:  Positive for headaches. Negative for light-headedness.   Psychiatric/Behavioral:  The patient is not nervous/anxious.          Objective:  /62   Ht 5' 4\" (1.626 m)   Wt 66.2 kg (146 lb)   LMP  (LMP Unknown) Comment: 06/2023  BMI 25.06 kg/m²    Physical Exam  Vitals reviewed.   Constitutional:       Appearance: Normal appearance.   HENT:      Head: Normocephalic.   Neck:      Thyroid: No thyroid mass or thyroid tenderness.   Cardiovascular:      Rate and Rhythm: Normal rate and regular rhythm.      Heart sounds: Normal heart sounds.   Pulmonary:      Effort: Pulmonary effort is normal.      Breath sounds: Normal breath sounds.   Chest:   Breasts:     Right: No mass, nipple discharge, skin change or tenderness.      Left: No mass, nipple " discharge, skin change or tenderness.   Abdominal:      General: There is no distension.      Palpations: There is no mass.      Tenderness: There is no abdominal tenderness. There is no guarding.   Genitourinary:     General: Normal vulva.      Exam position: Lithotomy position.      Labia:         Right: No tenderness or lesion.         Left: No tenderness or lesion.       Vagina: No vaginal discharge, tenderness, bleeding or lesions.      Cervix: No discharge, lesion, erythema or cervical bleeding.      Uterus: Normal. Not enlarged and not tender.       Adnexa:         Right: No mass, tenderness or fullness.          Left: No mass, tenderness or fullness.        Comments: 1.5 x 0.5 cm lesion right groin area- crease of leg. No drainage at this time, no pustule noted, no fluctuance, mild erythema. Tender to the touch. Deb notes that this is a recurrent abscess, that opens and drains. Resolves with antibiotic but returns after treatment stopped.   Musculoskeletal:      Cervical back: Normal range of motion.   Lymphadenopathy:      Upper Body:      Right upper body: No axillary adenopathy.      Left upper body: No axillary adenopathy.   Skin:     General: Skin is warm and dry.   Neurological:      Mental Status: She is alert.   Psychiatric:         Mood and Affect: Mood normal.         Behavior: Behavior normal.         Judgment: Judgment normal.             Kathleen Crawford CNM  [facility]  04/08/24 12:42 PM

## 2024-04-09 ENCOUNTER — APPOINTMENT (OUTPATIENT)
Dept: LAB | Facility: CLINIC | Age: 54
End: 2024-04-09
Payer: COMMERCIAL

## 2024-04-09 DIAGNOSIS — E55.9 VITAMIN D DEFICIENCY: ICD-10-CM

## 2024-04-09 LAB
25(OH)D3 SERPL-MCNC: 25.1 NG/ML (ref 30–100)
HPV HR 12 DNA CVX QL NAA+PROBE: NEGATIVE
HPV16 DNA CVX QL NAA+PROBE: NEGATIVE
HPV18 DNA CVX QL NAA+PROBE: NEGATIVE

## 2024-04-09 PROCEDURE — 36415 COLL VENOUS BLD VENIPUNCTURE: CPT

## 2024-04-09 PROCEDURE — 82306 VITAMIN D 25 HYDROXY: CPT

## 2024-04-11 DIAGNOSIS — E55.9 VITAMIN D DEFICIENCY: Primary | ICD-10-CM

## 2024-04-11 RX ORDER — ERGOCALCIFEROL 1.25 MG/1
50000 CAPSULE ORAL WEEKLY
Qty: 12 CAPSULE | Refills: 1 | Status: SHIPPED | OUTPATIENT
Start: 2024-04-11

## 2024-04-15 LAB
LAB AP GYN PRIMARY INTERPRETATION: NORMAL
Lab: NORMAL

## 2024-04-18 DIAGNOSIS — L02.818 CUTANEOUS ABSCESS OF OTHER SITE: Primary | ICD-10-CM

## 2024-04-18 RX ORDER — CEPHALEXIN 500 MG/1
500 CAPSULE ORAL EVERY 8 HOURS SCHEDULED
Qty: 21 CAPSULE | Refills: 0 | Status: SHIPPED | OUTPATIENT
Start: 2024-04-18 | End: 2024-04-25

## 2024-04-19 ENCOUNTER — TELEPHONE (OUTPATIENT)
Age: 54
End: 2024-04-19

## 2024-04-19 NOTE — TELEPHONE ENCOUNTER
----- Message from Kathleen Crawford CNM sent at 4/18/2024 12:13 PM EDT -----  Regarding: RE: Infection on the  inside my leg  Contact: [phone]  Antibiotic sent in- Keflex. If does not here from dermatology- please have her see surgeon or needs to go to ED    ----- Message -----  From: Lavinia Spicer RN  Sent: 4/18/2024  11:39 AM EDT  To: Kathleen Crawford CNM  Subject: FW: Infection on the  inside my leg                ----- Message -----  From: Deb Flowers  Sent: 4/18/2024   7:25 AM EDT  To: [facility]  Subject: Infection on the  inside my leg                  Good morning I havent gotten a call back from several dermatologist that I've called and I am in a lot of pain is it possible to get a prescription for this infection I have tried everything it drains and fills up again.

## 2024-05-03 ENCOUNTER — APPOINTMENT (OUTPATIENT)
Dept: LAB | Facility: CLINIC | Age: 54
End: 2024-05-03
Payer: COMMERCIAL

## 2024-05-03 DIAGNOSIS — R73.03 PREDIABETES: ICD-10-CM

## 2024-05-03 DIAGNOSIS — E03.9 ACQUIRED HYPOTHYROIDISM: ICD-10-CM

## 2024-05-03 LAB
T4 FREE SERPL-MCNC: 0.68 NG/DL (ref 0.61–1.12)
TSH SERPL DL<=0.05 MIU/L-ACNC: 1.47 UIU/ML (ref 0.45–4.5)

## 2024-05-03 PROCEDURE — 84439 ASSAY OF FREE THYROXINE: CPT

## 2024-05-03 PROCEDURE — 84443 ASSAY THYROID STIM HORMONE: CPT

## 2024-05-03 PROCEDURE — 83036 HEMOGLOBIN GLYCOSYLATED A1C: CPT

## 2024-05-03 PROCEDURE — 36415 COLL VENOUS BLD VENIPUNCTURE: CPT

## 2024-05-04 LAB
EST. AVERAGE GLUCOSE BLD GHB EST-MCNC: 123 MG/DL
HBA1C MFR BLD: 5.9 %

## 2024-05-07 ENCOUNTER — OFFICE VISIT (OUTPATIENT)
Dept: FAMILY MEDICINE CLINIC | Facility: CLINIC | Age: 54
End: 2024-05-07
Payer: COMMERCIAL

## 2024-05-07 VITALS
TEMPERATURE: 98 F | HEIGHT: 64 IN | DIASTOLIC BLOOD PRESSURE: 68 MMHG | HEART RATE: 79 BPM | WEIGHT: 145.2 LBS | BODY MASS INDEX: 24.79 KG/M2 | OXYGEN SATURATION: 98 % | SYSTOLIC BLOOD PRESSURE: 108 MMHG

## 2024-05-07 DIAGNOSIS — Z23 ENCOUNTER FOR IMMUNIZATION: Primary | ICD-10-CM

## 2024-05-07 DIAGNOSIS — E03.9 ACQUIRED HYPOTHYROIDISM: ICD-10-CM

## 2024-05-07 DIAGNOSIS — Z23 ENCOUNTER FOR VACCINATION: ICD-10-CM

## 2024-05-07 DIAGNOSIS — Z13.31 DEPRESSION SCREENING NEGATIVE: ICD-10-CM

## 2024-05-07 DIAGNOSIS — Z13.0 SCREENING FOR DEFICIENCY ANEMIA: ICD-10-CM

## 2024-05-07 DIAGNOSIS — Z13.220 SCREENING FOR LIPID DISORDERS: ICD-10-CM

## 2024-05-07 DIAGNOSIS — Z13.1 SCREENING FOR DIABETES MELLITUS: ICD-10-CM

## 2024-05-07 PROCEDURE — 90471 IMMUNIZATION ADMIN: CPT

## 2024-05-07 PROCEDURE — 99213 OFFICE O/P EST LOW 20 MIN: CPT | Performed by: NURSE PRACTITIONER

## 2024-05-07 PROCEDURE — 90715 TDAP VACCINE 7 YRS/> IM: CPT

## 2024-05-07 RX ORDER — CHLORAL HYDRATE 500 MG
CAPSULE ORAL
COMMUNITY

## 2024-05-07 NOTE — PROGRESS NOTES
Assessment/Plan:    Problem List Items Addressed This Visit    None  Visit Diagnoses     Encounter for immunization    -  Primary    Acquired hypothyroidism        Relevant Orders    TSH, 3rd generation    T4, free    TSH, 3rd generation with Free T4 reflex    Encounter for vaccination        Relevant Orders    Tdap Vaccine greater than or equal to 8yo (Completed)    Depression screening negative        Screening for lipid disorders        Relevant Orders    Lipid Panel with Direct LDL reflex    Screening for diabetes mellitus        Relevant Orders    Comprehensive metabolic panel    Screening for deficiency anemia        Relevant Orders    CBC and differential           Diagnoses and all orders for this visit:    Encounter for immunization    Acquired hypothyroidism  -     TSH, 3rd generation; Future  -     T4, free; Future  -     TSH, 3rd generation with Free T4 reflex; Future    Encounter for vaccination  -     Tdap Vaccine greater than or equal to 8yo    Depression screening negative    Screening for lipid disorders  -     Lipid Panel with Direct LDL reflex; Future    Screening for diabetes mellitus  -     Comprehensive metabolic panel; Future    Screening for deficiency anemia  -     CBC and differential; Future    Other orders  -     Omega-3 Fatty Acids (Omega-3 Fish Oil) 1000 MG CAPS; Take by mouth        No problem-specific Assessment & Plan notes found for this encounter.        Subjective:      Patient ID: Deb Flowers is a 53 y.o. female.    Patient presents with:  Follow-up:  Month follow up on TSH.     Thyroid Problem  Presents for follow-up visit. The symptoms have been stable.       The following portions of the patient's history were reviewed and updated as appropriate:   She has a past medical history of Cancer (HCC) (2005), Glaucoma, and Hypothyroidism.,  does not have any pertinent problems on file.,   has a past surgical history that includes RIGHT COLON RESECTION; Liver surgery; and Sinus  "surgery.,  family history includes Cancer in her father; Diabetes in her father and mother; Heart attack in her father and mother; Heart disease in her father, maternal grandmother, and mother; Hyperlipidemia in her father and mother; Hypertension in her father and mother; Prostate cancer in her father.,   reports that she has quit smoking. Her smoking use included cigarettes. She has been exposed to tobacco smoke. She has never used smokeless tobacco. She reports current alcohol use. She reports that she does not use drugs.,  is allergic to cetirizine, clarithromycin, iodinated contrast media, morphine, and other..  Current Outpatient Medications   Medication Sig Dispense Refill   • bimatoprost (LUMIGAN) 0.01 % ophthalmic drops 1 drop daily at bedtime     • cyclobenzaprine (FLEXERIL) 5 mg tablet Take 1 tablet (5 mg total) by mouth 3 (three) times a day as needed for muscle spasms 30 tablet 0   • ergocalciferol (VITAMIN D2) 50,000 units Take 1 capsule (50,000 Units total) by mouth once a week 12 capsule 1   • levothyroxine (Euthyrox) 50 mcg tablet Take 1 tablet (50 mcg total) by mouth daily in the early morning 90 tablet 0   • naproxen sodium (ALEVE) 220 MG tablet Take 220 mg by mouth every 12 (twelve) hours as needed     • Omega-3 Fatty Acids (Omega-3 Fish Oil) 1000 MG CAPS Take by mouth     • rosuvastatin (CRESTOR) 10 MG tablet TAKE 1 TABLET BY MOUTH EVERY DAY AT NIGHT       No current facility-administered medications for this visit.       Depression Screening and Follow-up Plan: Patient was screened for depression during today's encounter. They screened negative with a PHQ-2 score of 0.          Review of Systems   All other systems reviewed and are negative.        Objective:  Vitals:    05/07/24 0758   BP: 108/68   Pulse: 79   Temp: 98 °F (36.7 °C)   TempSrc: Tympanic   SpO2: 98%   Weight: 65.9 kg (145 lb 3.2 oz)   Height: 5' 4\" (1.626 m)     Body mass index is 24.92 kg/m².     Physical Exam  Vitals and " nursing note reviewed.   Constitutional:       Appearance: Normal appearance. She is well-developed.   HENT:      Head: Normocephalic and atraumatic.      Right Ear: Tympanic membrane, ear canal and external ear normal.      Left Ear: Tympanic membrane, ear canal and external ear normal.      Nose: Nose normal.      Mouth/Throat:      Mouth: Mucous membranes are moist.      Pharynx: Uvula midline.   Eyes:      General: Lids are normal.      Conjunctiva/sclera: Conjunctivae normal.      Pupils: Pupils are equal, round, and reactive to light.   Neck:      Thyroid: No thyroid mass or thyromegaly.      Vascular: No JVD.      Trachea: Trachea and phonation normal.   Cardiovascular:      Rate and Rhythm: Normal rate and regular rhythm.      Pulses: Normal pulses.      Heart sounds: Normal heart sounds, S1 normal and S2 normal. No murmur heard.     No friction rub. No gallop.   Pulmonary:      Effort: Pulmonary effort is normal.      Breath sounds: Normal breath sounds.   Abdominal:      General: Bowel sounds are normal.      Palpations: Abdomen is soft.      Tenderness: There is no abdominal tenderness.   Genitourinary:     Comments: Deferred   Musculoskeletal:         General: Normal range of motion.      Cervical back: Full passive range of motion without pain, normal range of motion and neck supple.      Right lower leg: No edema.      Left lower leg: No edema.   Lymphadenopathy:      Head:      Right side of head: No submental, submandibular, tonsillar, preauricular, posterior auricular or occipital adenopathy.      Left side of head: No submental, submandibular, tonsillar, preauricular, posterior auricular or occipital adenopathy.      Cervical: No cervical adenopathy.   Skin:     General: Skin is warm and dry.      Capillary Refill: Capillary refill takes less than 2 seconds.   Neurological:      General: No focal deficit present.      Mental Status: She is alert and oriented to person, place, and time.      Cranial  "Nerves: No cranial nerve deficit.      Sensory: Sensation is intact.      Motor: Motor function is intact.      Coordination: Coordination is intact.      Gait: Gait is intact.      Deep Tendon Reflexes: Reflexes are normal and symmetric.   Psychiatric:         Attention and Perception: Attention and perception normal.         Mood and Affect: Mood and affect normal.         Speech: Speech normal.         Behavior: Behavior normal. Behavior is cooperative.         Thought Content: Thought content normal.         Cognition and Memory: Cognition normal.         Judgment: Judgment normal.           Portions of the record may have been created with voice recognition software. Occasional wrong word or \"sound a like\" substitutions may have occurred due to the inherent limitations of voice recognition software. Read the chart carefully and recognize, using context, where substitutions have occurred. Contact me with any questions.  "

## 2024-05-29 DIAGNOSIS — E03.9 ACQUIRED HYPOTHYROIDISM: ICD-10-CM

## 2024-05-30 RX ORDER — LEVOTHYROXINE SODIUM 0.05 MG/1
50 TABLET ORAL
Qty: 90 TABLET | Refills: 1 | Status: SHIPPED | OUTPATIENT
Start: 2024-05-30

## 2024-06-24 ENCOUNTER — TELEPHONE (OUTPATIENT)
Age: 54
End: 2024-06-24

## 2024-06-24 NOTE — TELEPHONE ENCOUNTER
Patient returned call as per message left on vm from office. She has scheduled an appt. On July 2nd with Joel.

## 2024-07-02 ENCOUNTER — OFFICE VISIT (OUTPATIENT)
Dept: FAMILY MEDICINE CLINIC | Facility: CLINIC | Age: 54
End: 2024-07-02
Payer: COMMERCIAL

## 2024-07-02 ENCOUNTER — LAB (OUTPATIENT)
Dept: LAB | Facility: CLINIC | Age: 54
End: 2024-07-02
Payer: COMMERCIAL

## 2024-07-02 VITALS
HEIGHT: 64 IN | BODY MASS INDEX: 24.92 KG/M2 | SYSTOLIC BLOOD PRESSURE: 122 MMHG | TEMPERATURE: 97.8 F | OXYGEN SATURATION: 98 % | HEART RATE: 82 BPM | WEIGHT: 146 LBS | DIASTOLIC BLOOD PRESSURE: 84 MMHG

## 2024-07-02 DIAGNOSIS — L02.92 RECURRENT BOILS: ICD-10-CM

## 2024-07-02 DIAGNOSIS — Z13.1 SCREENING FOR DIABETES MELLITUS: ICD-10-CM

## 2024-07-02 DIAGNOSIS — Z12.12 SCREENING FOR COLORECTAL CANCER: ICD-10-CM

## 2024-07-02 DIAGNOSIS — Z13.0 SCREENING FOR DEFICIENCY ANEMIA: ICD-10-CM

## 2024-07-02 DIAGNOSIS — E03.9 ACQUIRED HYPOTHYROIDISM: ICD-10-CM

## 2024-07-02 DIAGNOSIS — Z12.11 SCREENING FOR COLORECTAL CANCER: ICD-10-CM

## 2024-07-02 DIAGNOSIS — M25.50 POLYARTHRALGIA: ICD-10-CM

## 2024-07-02 DIAGNOSIS — E55.9 VITAMIN D DEFICIENCY: ICD-10-CM

## 2024-07-02 DIAGNOSIS — M25.50 POLYARTHRALGIA: Primary | ICD-10-CM

## 2024-07-02 LAB
25(OH)D3 SERPL-MCNC: 51.4 NG/ML (ref 30–100)
BASOPHILS # BLD AUTO: 0.06 THOUSANDS/ÂΜL (ref 0–0.1)
BASOPHILS NFR BLD AUTO: 1 % (ref 0–1)
EOSINOPHIL # BLD AUTO: 0.47 THOUSAND/ÂΜL (ref 0–0.61)
EOSINOPHIL NFR BLD AUTO: 7 % (ref 0–6)
ERYTHROCYTE [DISTWIDTH] IN BLOOD BY AUTOMATED COUNT: 12.2 % (ref 11.6–15.1)
ERYTHROCYTE [SEDIMENTATION RATE] IN BLOOD: 12 MM/HOUR (ref 0–29)
HCT VFR BLD AUTO: 39.2 % (ref 34.8–46.1)
HGB BLD-MCNC: 13 G/DL (ref 11.5–15.4)
IMM GRANULOCYTES # BLD AUTO: 0.02 THOUSAND/UL (ref 0–0.2)
IMM GRANULOCYTES NFR BLD AUTO: 0 % (ref 0–2)
LYMPHOCYTES # BLD AUTO: 1.73 THOUSANDS/ÂΜL (ref 0.6–4.47)
LYMPHOCYTES NFR BLD AUTO: 25 % (ref 14–44)
MCH RBC QN AUTO: 32.5 PG (ref 26.8–34.3)
MCHC RBC AUTO-ENTMCNC: 33.2 G/DL (ref 31.4–37.4)
MCV RBC AUTO: 98 FL (ref 82–98)
MONOCYTES # BLD AUTO: 0.45 THOUSAND/ÂΜL (ref 0.17–1.22)
MONOCYTES NFR BLD AUTO: 6 % (ref 4–12)
NEUTROPHILS # BLD AUTO: 4.3 THOUSANDS/ÂΜL (ref 1.85–7.62)
NEUTS SEG NFR BLD AUTO: 61 % (ref 43–75)
NRBC BLD AUTO-RTO: 0 /100 WBCS
PLATELET # BLD AUTO: 295 THOUSANDS/UL (ref 149–390)
PMV BLD AUTO: 10.6 FL (ref 8.9–12.7)
RBC # BLD AUTO: 4 MILLION/UL (ref 3.81–5.12)
TSH SERPL DL<=0.05 MIU/L-ACNC: 0.76 UIU/ML (ref 0.45–4.5)
WBC # BLD AUTO: 7.03 THOUSAND/UL (ref 4.31–10.16)

## 2024-07-02 PROCEDURE — 86038 ANTINUCLEAR ANTIBODIES: CPT

## 2024-07-02 PROCEDURE — 36415 COLL VENOUS BLD VENIPUNCTURE: CPT

## 2024-07-02 PROCEDURE — 86618 LYME DISEASE ANTIBODY: CPT

## 2024-07-02 PROCEDURE — 82306 VITAMIN D 25 HYDROXY: CPT

## 2024-07-02 PROCEDURE — 85025 COMPLETE CBC W/AUTO DIFF WBC: CPT

## 2024-07-02 PROCEDURE — 86430 RHEUMATOID FACTOR TEST QUAL: CPT

## 2024-07-02 PROCEDURE — 86200 CCP ANTIBODY: CPT

## 2024-07-02 PROCEDURE — 84443 ASSAY THYROID STIM HORMONE: CPT

## 2024-07-02 PROCEDURE — 99214 OFFICE O/P EST MOD 30 MIN: CPT | Performed by: NURSE PRACTITIONER

## 2024-07-02 PROCEDURE — 86225 DNA ANTIBODY NATIVE: CPT

## 2024-07-02 PROCEDURE — 85652 RBC SED RATE AUTOMATED: CPT

## 2024-07-02 PROCEDURE — 80053 COMPREHEN METABOLIC PANEL: CPT

## 2024-07-02 PROCEDURE — 86140 C-REACTIVE PROTEIN: CPT

## 2024-07-02 NOTE — PROGRESS NOTES
Assessment/Plan:    Problem List Items Addressed This Visit    None  Visit Diagnoses     Polyarthralgia    -  Primary    Relevant Orders    CBC and differential    Comprehensive metabolic panel    Lyme Total AB W Reflex to IGM/IGG    RAKESH Screen w/ Reflex to Titer/Pattern    C-reactive protein    Sedimentation rate, automated    RF Screen w/ Reflex to Titer    Cyclic citrul peptide antibody, IgG    Anti-DNA antibody, double-stranded    Acquired hypothyroidism        Relevant Orders    TSH, 3rd generation with Free T4 reflex    Recurrent boils        Relevant Orders    Ambulatory Referral to Dermatology    Screening for deficiency anemia        Relevant Orders    CBC and differential    Screening for diabetes mellitus        Relevant Orders    Comprehensive metabolic panel    Screening for colorectal cancer        Relevant Orders    Ambulatory Referral to Gastroenterology           Diagnoses and all orders for this visit:    Polyarthralgia  -     CBC and differential; Future  -     Comprehensive metabolic panel; Future  -     Lyme Total AB W Reflex to IGM/IGG; Future  -     RAKESH Screen w/ Reflex to Titer/Pattern; Future  -     C-reactive protein; Future  -     Sedimentation rate, automated; Future  -     RF Screen w/ Reflex to Titer; Future  -     Cyclic citrul peptide antibody, IgG; Future  -     Anti-DNA antibody, double-stranded; Future    Acquired hypothyroidism  -     TSH, 3rd generation with Free T4 reflex; Future    Recurrent boils  -     Ambulatory Referral to Dermatology; Future    Screening for deficiency anemia  -     CBC and differential; Future    Screening for diabetes mellitus  -     Comprehensive metabolic panel; Future    Screening for colorectal cancer  -     Ambulatory Referral to Gastroenterology; Future        No problem-specific Assessment & Plan notes found for this encounter.        Subjective:      Patient ID: Deb Flowers is a 53 y.o. female.    Patient presents with:  muscle aches: has  "dots/discoloration on legs, muscle aches, constant pain. She would like a referral for dermatologist.     Patient reports arthritic type pain specifically of her hands.  She states that arthritic pain is most prevalent upon movement after rest and noticeable after extended periods of activity.  She reports stiffness of the fingers of bilateral hands and even the tight lightest touch to the phalanges knuckles creates \"immense pain\".  She has not noted any resolution or improvement in her symptoms since starting treatment for hypothyroidism.  Patient is requesting for her autoimmune disorder screening specifically of rheumatoid and lupus although she has no known family history of autoimmune disorders.    Patient does report that she has a chronic skin condition that her boils.  She states that her previously PCP told her that it was a \"blood infection\".  Patient reports boils located at the pelvic/leg juncture bilaterally and on her coccyx.  Patient feels that this is family related as her mother had similar.  Patient is requesting a consultation for dermatology.      Thyroid Problem  Presents for follow-up visit. The symptoms have been stable.       The following portions of the patient's history were reviewed and updated as appropriate:   She has a past medical history of Cancer (HCC) (2005), Glaucoma, and Hypothyroidism.,  does not have any pertinent problems on file.,   has a past surgical history that includes RIGHT COLON RESECTION; Liver surgery; and Sinus surgery.,  family history includes Cancer in her father; Diabetes in her father and mother; Heart attack in her father and mother; Heart disease in her father, maternal grandmother, and mother; Hyperlipidemia in her father and mother; Hypertension in her father and mother; Prostate cancer in her father.,   reports that she has quit smoking. Her smoking use included cigarettes. She has been exposed to tobacco smoke. She has never used smokeless tobacco. She " "reports current alcohol use. She reports that she does not use drugs.,  is allergic to cetirizine, clarithromycin, iodinated contrast media, morphine, and other..  Current Outpatient Medications   Medication Sig Dispense Refill   • bimatoprost (LUMIGAN) 0.01 % ophthalmic drops 1 drop daily at bedtime     • cyclobenzaprine (FLEXERIL) 5 mg tablet Take 1 tablet (5 mg total) by mouth 3 (three) times a day as needed for muscle spasms 30 tablet 0   • ergocalciferol (VITAMIN D2) 50,000 units Take 1 capsule (50,000 Units total) by mouth once a week 12 capsule 1   • levothyroxine (Euthyrox) 50 mcg tablet Take 1 tablet (50 mcg total) by mouth daily in the early morning 90 tablet 1   • naproxen sodium (ALEVE) 220 MG tablet Take 220 mg by mouth every 12 (twelve) hours as needed     • Omega-3 Fatty Acids (Omega-3 Fish Oil) 1000 MG CAPS Take by mouth     • rosuvastatin (CRESTOR) 10 MG tablet TAKE 1 TABLET BY MOUTH EVERY DAY AT NIGHT       No current facility-administered medications for this visit.            Review of Systems   Musculoskeletal:  Positive for arthralgias.   Skin:  Positive for color change.   All other systems reviewed and are negative.        Objective:  Vitals:    07/02/24 0803   BP: 122/84   Pulse: 82   Temp: 97.8 °F (36.6 °C)   TempSrc: Tympanic   SpO2: 98%   Weight: 66.2 kg (146 lb)   Height: 5' 4\" (1.626 m)     Body mass index is 25.06 kg/m².     Physical Exam  Vitals and nursing note reviewed.   Constitutional:       Appearance: Normal appearance. She is well-developed.   HENT:      Head: Normocephalic and atraumatic.      Right Ear: Tympanic membrane, ear canal and external ear normal.      Left Ear: Tympanic membrane, ear canal and external ear normal.      Nose: Nose normal.      Mouth/Throat:      Mouth: Mucous membranes are moist.      Pharynx: Uvula midline.   Eyes:      General: Lids are normal.      Conjunctiva/sclera: Conjunctivae normal.      Pupils: Pupils are equal, round, and reactive to " light.   Neck:      Thyroid: No thyroid mass or thyromegaly.      Vascular: No JVD.      Trachea: Trachea and phonation normal.   Cardiovascular:      Rate and Rhythm: Normal rate and regular rhythm.      Pulses: Normal pulses.      Heart sounds: Normal heart sounds, S1 normal and S2 normal. No murmur heard.     No friction rub. No gallop.   Pulmonary:      Effort: Pulmonary effort is normal.      Breath sounds: Normal breath sounds.   Abdominal:      General: Bowel sounds are normal.      Palpations: Abdomen is soft.      Tenderness: There is no abdominal tenderness.   Genitourinary:     Comments: Deferred   Musculoskeletal:         General: Normal range of motion.      Cervical back: Full passive range of motion without pain, normal range of motion and neck supple.      Right lower leg: No edema.      Left lower leg: No edema.   Lymphadenopathy:      Head:      Right side of head: No submental, submandibular, tonsillar, preauricular, posterior auricular or occipital adenopathy.      Left side of head: No submental, submandibular, tonsillar, preauricular, posterior auricular or occipital adenopathy.      Cervical: No cervical adenopathy.   Skin:     General: Skin is warm and dry.      Capillary Refill: Capillary refill takes less than 2 seconds.          Neurological:      General: No focal deficit present.      Mental Status: She is alert and oriented to person, place, and time.      Cranial Nerves: No cranial nerve deficit.      Sensory: Sensation is intact.      Motor: Motor function is intact.      Coordination: Coordination is intact.      Gait: Gait is intact.      Deep Tendon Reflexes: Reflexes are normal and symmetric.   Psychiatric:         Attention and Perception: Attention and perception normal.         Mood and Affect: Mood and affect normal.         Speech: Speech normal.         Behavior: Behavior normal. Behavior is cooperative.         Thought Content: Thought content normal.         Cognition and  "Memory: Cognition normal.         Judgment: Judgment normal.         Portions of the record may have been created with voice recognition software. Occasional wrong word or \"sound a like\" substitutions may have occurred due to the inherent limitations of voice recognition software. Read the chart carefully and recognize, using context, where substitutions have occurred. Contact me with any questions.  "

## 2024-07-03 ENCOUNTER — NURSE TRIAGE (OUTPATIENT)
Age: 54
End: 2024-07-03

## 2024-07-03 DIAGNOSIS — L03.314 CELLULITIS OF RIGHT GROIN: Primary | ICD-10-CM

## 2024-07-03 LAB
ALBUMIN SERPL BCG-MCNC: 4.2 G/DL (ref 3.5–5)
ALP SERPL-CCNC: 65 U/L (ref 34–104)
ALT SERPL W P-5'-P-CCNC: 11 U/L (ref 7–52)
ANA SER QL IA: NEGATIVE
ANION GAP SERPL CALCULATED.3IONS-SCNC: 10 MMOL/L (ref 4–13)
AST SERPL W P-5'-P-CCNC: 12 U/L (ref 13–39)
B BURGDOR IGG+IGM SER QL IA: NEGATIVE
BILIRUB SERPL-MCNC: 0.42 MG/DL (ref 0.2–1)
BUN SERPL-MCNC: 14 MG/DL (ref 5–25)
CALCIUM SERPL-MCNC: 9.4 MG/DL (ref 8.4–10.2)
CHLORIDE SERPL-SCNC: 106 MMOL/L (ref 96–108)
CO2 SERPL-SCNC: 24 MMOL/L (ref 21–32)
CREAT SERPL-MCNC: 0.7 MG/DL (ref 0.6–1.3)
CRP SERPL QL: 2.4 MG/L
DSDNA AB SER-ACNC: 1 IU/ML (ref 0–9)
GFR SERPL CREATININE-BSD FRML MDRD: 99 ML/MIN/1.73SQ M
GLUCOSE P FAST SERPL-MCNC: 122 MG/DL (ref 65–99)
POTASSIUM SERPL-SCNC: 4 MMOL/L (ref 3.5–5.3)
PROT SERPL-MCNC: 6.9 G/DL (ref 6.4–8.4)
RHEUMATOID FACT SER QL LA: NEGATIVE
SODIUM SERPL-SCNC: 140 MMOL/L (ref 135–147)

## 2024-07-03 RX ORDER — CEPHALEXIN 500 MG/1
500 CAPSULE ORAL 2 TIMES DAILY
Qty: 20 CAPSULE | Refills: 0 | Status: SHIPPED | OUTPATIENT
Start: 2024-07-03 | End: 2024-07-13

## 2024-07-03 NOTE — TELEPHONE ENCOUNTER
"Reason for Disposition   Patient wants to be seen   Broken (ruptured) blister and caller doesn't want to trim the dead skin    Answer Assessment - Initial Assessment Questions  1. ONSET: \"When did it happen?\" If happened < 3 hours ago, ask: \"Did you apply cold water?\" If not, give First Aid Advice immediately.       7/1 and applied cold water/ice pack  2. LOCATION: \"Where is the burn located?\"       Inside leg  3. BURN SIZE: \"How large is the burn?\"  The palm is roughly 1% of the total body surface area (BSA).      Size of a 20 dollar wilberto  4. SEVERITY OF THE BURN: \"Are there any blisters?\"       Denies blisters  5. MECHANISM: \"Tell me how it happened.\"      Pt applied garlic and tea tree oil  6. PAIN: \"Are you having any pain?\" \"How bad is the pain?\" (Scale 1-10; or mild, moderate, severe)    - MILD (1-3): doesn't interfere with normal activities     - MODERATE (4-7): interferes with normal activities or awakens from sleep     - SEVERE (8-10): excruciating pain, unable to do any normal activities       mild  7. INHALATION INJURY: \"Were you exposed to any smoke or fumes?\" If Yes, ask: \"Do you have any cough or difficulty breathing?\"      denies  8. OTHER SYMPTOMS: \"Do you have any other symptoms?\" (e.g., headache, nausea)      denies  9. PREGNANCY: \"Is there any chance you are pregnant?\" \"When was your last menstrual period?\"      N/a    Protocols used: [facility]    "

## 2024-07-05 DIAGNOSIS — R73.01 ELEVATED FASTING GLUCOSE: Primary | ICD-10-CM

## 2024-07-05 LAB — CCP AB SER IA-ACNC: 1

## 2024-07-25 ENCOUNTER — TELEPHONE (OUTPATIENT)
Age: 54
End: 2024-07-25

## 2024-07-25 ENCOUNTER — PATIENT MESSAGE (OUTPATIENT)
Dept: GASTROENTEROLOGY | Facility: CLINIC | Age: 54
End: 2024-07-25

## 2024-07-25 ENCOUNTER — PREP FOR PROCEDURE (OUTPATIENT)
Age: 54
End: 2024-07-25

## 2024-07-25 DIAGNOSIS — Z12.11 SCREENING FOR COLON CANCER: Primary | ICD-10-CM

## 2024-07-25 NOTE — TELEPHONE ENCOUNTER
07/25/24  Screened by: Tessie Moon    Referring Provider ADAIR Nunez    Pre- Screening:     There is no height or weight on file to calculate BMI.  Has patient been referred for a routine screening Colonoscopy? yes  Is the patient between 45-75 years old? yes      Previous Colonoscopy yes   If yes:    Date: 5/19    Facility: [facility]    Reason: Hx of cancer          Does the patient want to see a Gastroenterologist prior to their procedure OR are they having any GI symptoms? no    Has the patient been hospitalized or had abdominal surgery in the past 6 months? no    Does the patient use supplemental oxygen? no    Does the patient take Coumadin, Lovenox, Plavix, Elliquis, Xarelto, or other blood thinning medication? no    Has the patient had a stroke, cardiac event, or stent placed in the past year? no        If patient is between 45yrs - 49yrs, please advise patient that we will have to confirm benefits & coverage with their insurance company for a routine screening colonoscopy.

## 2024-07-25 NOTE — TELEPHONE ENCOUNTER
Scheduled date of colonoscopy (as of today):8/13/2024  Physician performing colonoscopy:  Location of colonoscopy: [facility]  Bowel prep reviewed with patient: Tessie Moon  Instructions reviewed with patient by: Tessie Moon  Clearances: N/A

## 2024-08-02 ENCOUNTER — ANESTHESIA EVENT (OUTPATIENT)
Dept: ANESTHESIOLOGY | Facility: HOSPITAL | Age: 54
End: 2024-08-02

## 2024-08-02 ENCOUNTER — ANESTHESIA (OUTPATIENT)
Dept: ANESTHESIOLOGY | Facility: HOSPITAL | Age: 54
End: 2024-08-02

## 2024-08-06 ENCOUNTER — TELEPHONE (OUTPATIENT)
Age: 54
End: 2024-08-06

## 2024-08-06 NOTE — TELEPHONE ENCOUNTER
Rescheduled date of colonoscopy (as of today): 09/23/2024  Physician performing colonoscopy: DR DAVIS  Location of colonoscopy: [facility]  Bowel prep reviewed with patient: MIRALAX/DULCOLAX  Instructions reviewed with patient by: Previously reviewed with pt. Verified pt has all procedure directions.  Clearances: n/a

## 2024-09-04 ENCOUNTER — PATIENT MESSAGE (OUTPATIENT)
Dept: GASTROENTEROLOGY | Facility: CLINIC | Age: 54
End: 2024-09-04

## 2024-09-09 ENCOUNTER — ANESTHESIA EVENT (OUTPATIENT)
Dept: ANESTHESIOLOGY | Facility: HOSPITAL | Age: 54
End: 2024-09-09

## 2024-09-09 ENCOUNTER — ANESTHESIA (OUTPATIENT)
Dept: ANESTHESIOLOGY | Facility: HOSPITAL | Age: 54
End: 2024-09-09

## 2024-09-19 ENCOUNTER — TELEPHONE (OUTPATIENT)
Dept: GASTROENTEROLOGY | Facility: MEDICAL CENTER | Age: 54
End: 2024-09-19

## 2024-09-20 NOTE — TELEPHONE ENCOUNTER
Spoke with pt she denies fever, has post nasal drip. Advised ok to proceed with Colonoscopy on Monday

## 2024-09-23 ENCOUNTER — ANESTHESIA EVENT (OUTPATIENT)
Dept: GASTROENTEROLOGY | Facility: MEDICAL CENTER | Age: 54
End: 2024-09-23
Payer: COMMERCIAL

## 2024-09-23 ENCOUNTER — HOSPITAL ENCOUNTER (OUTPATIENT)
Dept: GASTROENTEROLOGY | Facility: MEDICAL CENTER | Age: 54
Setting detail: OUTPATIENT SURGERY
Discharge: HOME | End: 2024-09-23
Payer: COMMERCIAL

## 2024-09-23 ENCOUNTER — ANESTHESIA (OUTPATIENT)
Dept: GASTROENTEROLOGY | Facility: MEDICAL CENTER | Age: 54
End: 2024-09-23
Payer: COMMERCIAL

## 2024-09-23 VITALS
BODY MASS INDEX: 25.1 KG/M2 | RESPIRATION RATE: 18 BRPM | OXYGEN SATURATION: 100 % | SYSTOLIC BLOOD PRESSURE: 108 MMHG | HEIGHT: 64 IN | WEIGHT: 147 LBS | TEMPERATURE: 97.2 F | HEART RATE: 68 BPM | DIASTOLIC BLOOD PRESSURE: 57 MMHG

## 2024-09-23 DIAGNOSIS — Z12.11 SCREENING FOR COLON CANCER: ICD-10-CM

## 2024-09-23 LAB
EXT PREGNANCY TEST URINE: NEGATIVE
EXT. CONTROL: NORMAL

## 2024-09-23 PROCEDURE — 88305 TISSUE EXAM BY PATHOLOGIST: CPT | Performed by: STUDENT IN AN ORGANIZED HEALTH CARE EDUCATION/TRAINING PROGRAM

## 2024-09-23 PROCEDURE — 81025 URINE PREGNANCY TEST: CPT | Performed by: ANESTHESIOLOGY

## 2024-09-23 PROCEDURE — 45390 COLONOSCOPY W/RESECTION: CPT | Performed by: INTERNAL MEDICINE

## 2024-09-23 RX ORDER — PROPOFOL 10 MG/ML
INJECTION, EMULSION INTRAVENOUS CONTINUOUS PRN
Status: DISCONTINUED | OUTPATIENT
Start: 2024-09-23 | End: 2024-09-23

## 2024-09-23 RX ORDER — SODIUM CHLORIDE 9 MG/ML
125 INJECTION, SOLUTION INTRAVENOUS CONTINUOUS
Status: DISCONTINUED | OUTPATIENT
Start: 2024-09-23 | End: 2024-09-27 | Stop reason: HOSPADM

## 2024-09-23 RX ORDER — PROPOFOL 10 MG/ML
INJECTION, EMULSION INTRAVENOUS AS NEEDED
Status: DISCONTINUED | OUTPATIENT
Start: 2024-09-23 | End: 2024-09-23

## 2024-09-23 RX ORDER — ACETAMINOPHEN 325 MG/1
650 TABLET ORAL ONCE AS NEEDED
Status: COMPLETED | OUTPATIENT
Start: 2024-09-23 | End: 2024-09-23

## 2024-09-23 RX ORDER — ACETAMINOPHEN 325 MG/1
TABLET ORAL
Status: DISCONTINUED
Start: 2024-09-23 | End: 2024-09-27 | Stop reason: HOSPADM

## 2024-09-23 RX ADMIN — SODIUM CHLORIDE 125 ML/HR: 0.9 INJECTION, SOLUTION INTRAVENOUS at 10:54

## 2024-09-23 RX ADMIN — PROPOFOL 100 MCG/KG/MIN: 10 INJECTION, EMULSION INTRAVENOUS at 11:29

## 2024-09-23 RX ADMIN — PROPOFOL 100 MG: 10 INJECTION, EMULSION INTRAVENOUS at 11:28

## 2024-09-23 RX ADMIN — ACETAMINOPHEN 325MG 325 MG: 325 TABLET ORAL at 12:27

## 2024-09-23 NOTE — ANESTHESIA POSTPROCEDURE EVALUATION
Post-Op Assessment Note    CV Status:  Stable    Pain management: adequate       Mental Status:  Alert and awake   Hydration Status:  Euvolemic   PONV Controlled:  Controlled   Airway Patency:  Patent     Post Op Vitals Reviewed: Yes    No anethesia notable event occurred.    Staff: CRNA               BP   118/74   Temp   97.9   Pulse  70   Resp   18   SpO2   99

## 2024-09-23 NOTE — ANESTHESIA PREPROCEDURE EVALUATION
Procedure:  COLONOSCOPY    Relevant Problems   CARDIO   (+) Chest pain   (+) Hyperlipidemia, mixed      ENDO   (+) Hypothyroidism      PULMONARY   (+) Cigarette smoker          Physical Exam    Airway    Mallampati score: II  TM Distance: >3 FB  Neck ROM: full     Dental   Comment: Several teeth broken at the roots from chemo per pt     Cardiovascular      Pulmonary      Other Findings  post-pubertal.      Anesthesia Plan  ASA Score- 2     Anesthesia Type- IV sedation with anesthesia with ASA Monitors.         Additional Monitors:     Airway Plan:            Plan Factors-Exercise tolerance (METS): >4 METS.    Chart reviewed.    Patient summary reviewed.                  Induction- intravenous.    Postoperative Plan-     Perioperative Resuscitation Plan - Level 1 - Full Code.       Informed Consent- Anesthetic plan and risks discussed with patient.  I personally reviewed this patient with the CRNA. Discussed and agreed on the Anesthesia Plan with the CRNA..

## 2024-09-23 NOTE — H&P
"History and Physical -  [facility]  Deb Flowers 54 y.o. female MRN: [id number]                  HPI: Deb Flowers is a 54 y.o. year old female who presents for colonoscopy for colon cancer screening.      REVIEW OF SYSTEMS: Per the HPI, and otherwise unremarkable.    Historical Information   Past Medical History:   Diagnosis Date    Cancer (HCC) 2005    Colon CA stage 4    Colon cancer (HCC)     Disease of thyroid gland     Glaucoma     Hypothyroidism      Past Surgical History:   Procedure Laterality Date    LIVER SURGERY      RIGHT COLON RESECTION      chemo    SINUS SURGERY       Social History   Social History     Substance and Sexual Activity   Alcohol Use Yes    Comment: social     Social History     Substance and Sexual Activity   Drug Use Never     Social History     Tobacco Use   Smoking Status Former    Types: Cigarettes    Passive exposure: Current   Smokeless Tobacco Never   Tobacco Comments    Quit \"August 2023\".     Family History   Problem Relation Age of Onset    Heart disease Mother     Heart attack Mother     Hypertension Mother     Hyperlipidemia Mother     Diabetes Mother     Cancer Father         bladder    Hypertension Father     Hyperlipidemia Father     Heart attack Father     Heart disease Father     Diabetes Father     Prostate cancer Father     Heart disease Maternal Grandmother        Meds/Allergies     Not in a hospital admission.    Allergies   Allergen Reactions    Cetirizine GI Intolerance     Abdominal pain    Clarithromycin      Other reaction(s): GI Upset    Iodinated Contrast Media     Morphine      Other reaction(s): GI Upset    Other Hives       Objective     Blood pressure 113/53, pulse 72, temperature (!) 97.2 °F (36.2 °C), temperature source Temporal, resp. rate 18, height 5' 4\" (1.626 m), weight 66.7 kg (147 lb), SpO2 98%.      PHYSICAL EXAM    Gen: NAD  CV: RRR  CHEST: Clear  ABD: soft, NT/ND  EXT: no edema      ASSESSMENT/PLAN:  Deb Flowers is a 54 y.o. " year old female who presents for colonoscopy for colon cancer screening. The patient is stable and optimized for the procedure, we reviewed risk and benefits. Risk include but not limited to infection, bleeding, perforation and missing a lesion.

## 2024-09-25 ENCOUNTER — TELEPHONE (OUTPATIENT)
Age: 54
End: 2024-09-25

## 2024-09-25 NOTE — TELEPHONE ENCOUNTER
This office needs fax resent only received face sheet please refax COLON report and Pathology to Fax number is [phone]. Attention Dr. Lemos.

## 2024-09-25 NOTE — TELEPHONE ENCOUNTER
Patients GI provider:  Dr. Pereira    Number to return call: [phone]    Reason for call: Staff from [facility] called in requesting a copy of pts colonoscopy & pathology report from 9/23/24. Fax number is [phone]. Attention Dr. Lemos.     Scheduled procedure/appointment date if applicable: Apt/procedure N/A

## 2024-09-26 PROCEDURE — 88305 TISSUE EXAM BY PATHOLOGIST: CPT | Performed by: STUDENT IN AN ORGANIZED HEALTH CARE EDUCATION/TRAINING PROGRAM

## 2024-09-29 DIAGNOSIS — Z86.0100 HISTORY OF COLON POLYPS: Primary | ICD-10-CM

## 2024-09-29 RX ORDER — BISACODYL 5 MG/1
20 TABLET, DELAYED RELEASE ORAL ONCE
Qty: 4 TABLET | Refills: 0 | Status: SHIPPED | OUTPATIENT
Start: 2024-09-29 | End: 2024-09-29

## 2024-09-30 ENCOUNTER — TELEPHONE (OUTPATIENT)
Dept: GASTROENTEROLOGY | Facility: CLINIC | Age: 54
End: 2024-09-30

## 2024-09-30 NOTE — TELEPHONE ENCOUNTER
Scheduled date of colonoscopy (as of today):03.26.25  Physician performing colonoscopy:DR DAVIS  Location of colonoscopy:[place]  Bowel prep reviewed with patient:ALEXANDER  Instructions reviewed with patient by:SENT VIA [employer]  Clearances: N/A

## 2024-09-30 NOTE — TELEPHONE ENCOUNTER
----- Message from Bethel DINERO sent at 9/30/2024  7:25 AM EDT -----    ----- Message -----  From: Arnold Pereira MD  Sent: 9/29/2024  10:31 AM EDT  To: #    Dear staff: Please arrange colonoscopy in 6 months with me.

## 2024-10-10 ENCOUNTER — TELEPHONE (OUTPATIENT)
Dept: FAMILY MEDICINE CLINIC | Facility: CLINIC | Age: 54
End: 2024-10-10

## 2024-10-10 DIAGNOSIS — Z12.4 SCREENING FOR CERVICAL CANCER: ICD-10-CM

## 2024-10-10 DIAGNOSIS — Z98.890 HISTORY OF COLONOSCOPY: Primary | Chronic | ICD-10-CM

## 2024-10-10 DIAGNOSIS — Z12.31 BREAST CANCER SCREENING BY MAMMOGRAM: ICD-10-CM

## 2024-10-10 DIAGNOSIS — L65.9 HAIR LOSS DISORDER: ICD-10-CM

## 2024-10-10 DIAGNOSIS — E78.2 MIXED HYPERLIPIDEMIA: Chronic | ICD-10-CM

## 2024-10-10 DIAGNOSIS — R73.03 PREDIABETES: Chronic | ICD-10-CM

## 2024-10-10 DIAGNOSIS — Z00.00 ANNUAL PHYSICAL EXAM: Chronic | ICD-10-CM

## 2024-10-10 DIAGNOSIS — E03.8 OTHER SPECIFIED HYPOTHYROIDISM: Chronic | ICD-10-CM

## 2024-10-10 DIAGNOSIS — E55.9 VITAMIN D DEFICIENCY: Chronic | ICD-10-CM

## 2024-10-10 DIAGNOSIS — Z13.0 SCREENING FOR IRON DEFICIENCY ANEMIA: ICD-10-CM

## 2024-10-10 DIAGNOSIS — Z72.0 TOBACCO ABUSE: Chronic | ICD-10-CM

## 2024-10-10 DIAGNOSIS — E83.42 HYPOMAGNESEMIA: ICD-10-CM

## 2024-10-10 PROBLEM — Z85.038 HISTORY OF COLON CANCER: Chronic | Status: ACTIVE | Noted: 2021-05-28

## 2024-10-10 NOTE — TELEPHONE ENCOUNTER
I ordered her a bunch of labs. Please ask her to get them soon. Also ask her if she takes any herbal medicine or over the counter vitamins or minerals. Thank you

## 2024-10-10 NOTE — TELEPHONE ENCOUNTER
Colonoscopy results are worrying her she had stage 4 colon cancer 20 years ago and they told her there is an inch long something in her results She is exhausted all the time she is really worried her hair is falling out. Joel told her it was old age and menopause he also told her it could be her thyroid.   If your able to look at the results is there any tests she can do before her appointment in November? She is a mess really worried and upset she was told it is pre cancerous.

## 2024-10-11 ENCOUNTER — APPOINTMENT (OUTPATIENT)
Dept: LAB | Facility: CLINIC | Age: 54
End: 2024-10-11
Payer: COMMERCIAL

## 2024-10-11 DIAGNOSIS — E78.2 MIXED HYPERLIPIDEMIA: Chronic | ICD-10-CM

## 2024-10-11 DIAGNOSIS — E55.9 VITAMIN D DEFICIENCY: Chronic | ICD-10-CM

## 2024-10-11 DIAGNOSIS — Z00.6 ENCOUNTER FOR EXAMINATION FOR NORMAL COMPARISON OR CONTROL IN CLINICAL RESEARCH PROGRAM: ICD-10-CM

## 2024-10-11 DIAGNOSIS — Z00.00 ANNUAL PHYSICAL EXAM: Chronic | ICD-10-CM

## 2024-10-11 DIAGNOSIS — Z13.0 SCREENING FOR IRON DEFICIENCY ANEMIA: ICD-10-CM

## 2024-10-11 DIAGNOSIS — E03.8 OTHER SPECIFIED HYPOTHYROIDISM: ICD-10-CM

## 2024-10-11 DIAGNOSIS — R73.03 PREDIABETES: Chronic | ICD-10-CM

## 2024-10-11 DIAGNOSIS — E83.42 HYPOMAGNESEMIA: ICD-10-CM

## 2024-10-11 LAB
25(OH)D3 SERPL-MCNC: 34.9 NG/ML (ref 30–100)
ALBUMIN SERPL BCG-MCNC: 4.2 G/DL (ref 3.5–5)
ALP SERPL-CCNC: 66 U/L (ref 34–104)
ALT SERPL W P-5'-P-CCNC: 9 U/L (ref 7–52)
ANION GAP SERPL CALCULATED.3IONS-SCNC: 7 MMOL/L (ref 4–13)
AST SERPL W P-5'-P-CCNC: 11 U/L (ref 13–39)
BASOPHILS # BLD AUTO: 0.05 THOUSANDS/ΜL (ref 0–0.1)
BASOPHILS NFR BLD AUTO: 1 % (ref 0–1)
BILIRUB SERPL-MCNC: 0.39 MG/DL (ref 0.2–1)
BUN SERPL-MCNC: 16 MG/DL (ref 5–25)
CALCIUM SERPL-MCNC: 9.1 MG/DL (ref 8.4–10.2)
CHLORIDE SERPL-SCNC: 104 MMOL/L (ref 96–108)
CHOLEST SERPL-MCNC: 212 MG/DL
CO2 SERPL-SCNC: 26 MMOL/L (ref 21–32)
CREAT SERPL-MCNC: 0.65 MG/DL (ref 0.6–1.3)
EOSINOPHIL # BLD AUTO: 0.31 THOUSAND/ΜL (ref 0–0.61)
EOSINOPHIL NFR BLD AUTO: 5 % (ref 0–6)
ERYTHROCYTE [DISTWIDTH] IN BLOOD BY AUTOMATED COUNT: 13 % (ref 11.6–15.1)
EST. AVERAGE GLUCOSE BLD GHB EST-MCNC: 123 MG/DL
FERRITIN SERPL-MCNC: 27 NG/ML (ref 11–307)
FOLATE SERPL-MCNC: 16.3 NG/ML
GFR SERPL CREATININE-BSD FRML MDRD: 100 ML/MIN/1.73SQ M
GLUCOSE P FAST SERPL-MCNC: 103 MG/DL (ref 65–99)
HBA1C MFR BLD: 5.9 %
HCT VFR BLD AUTO: 39.3 % (ref 34.8–46.1)
HDLC SERPL-MCNC: 55 MG/DL
HGB BLD-MCNC: 12.7 G/DL (ref 11.5–15.4)
IMM GRANULOCYTES # BLD AUTO: 0.01 THOUSAND/UL (ref 0–0.2)
IMM GRANULOCYTES NFR BLD AUTO: 0 % (ref 0–2)
IRON SATN MFR SERPL: 20 % (ref 15–50)
IRON SERPL-MCNC: 73 UG/DL (ref 50–212)
LDLC SERPL CALC-MCNC: 129 MG/DL (ref 0–100)
LYMPHOCYTES # BLD AUTO: 1.6 THOUSANDS/ΜL (ref 0.6–4.47)
LYMPHOCYTES NFR BLD AUTO: 28 % (ref 14–44)
MAGNESIUM SERPL-MCNC: 2 MG/DL (ref 1.9–2.7)
MCH RBC QN AUTO: 32.7 PG (ref 26.8–34.3)
MCHC RBC AUTO-ENTMCNC: 32.3 G/DL (ref 31.4–37.4)
MCV RBC AUTO: 101 FL (ref 82–98)
MONOCYTES # BLD AUTO: 0.42 THOUSAND/ΜL (ref 0.17–1.22)
MONOCYTES NFR BLD AUTO: 7 % (ref 4–12)
NEUTROPHILS # BLD AUTO: 3.34 THOUSANDS/ΜL (ref 1.85–7.62)
NEUTS SEG NFR BLD AUTO: 59 % (ref 43–75)
NONHDLC SERPL-MCNC: 157 MG/DL
NRBC BLD AUTO-RTO: 0 /100 WBCS
PLATELET # BLD AUTO: 287 THOUSANDS/UL (ref 149–390)
PMV BLD AUTO: 10.4 FL (ref 8.9–12.7)
POTASSIUM SERPL-SCNC: 4.2 MMOL/L (ref 3.5–5.3)
PROT SERPL-MCNC: 6.8 G/DL (ref 6.4–8.4)
RBC # BLD AUTO: 3.88 MILLION/UL (ref 3.81–5.12)
SODIUM SERPL-SCNC: 137 MMOL/L (ref 135–147)
T3 SERPL-MCNC: 0.9 NG/ML
T4 FREE SERPL-MCNC: 0.65 NG/DL (ref 0.61–1.12)
TIBC SERPL-MCNC: 360 UG/DL (ref 250–450)
TRIGL SERPL-MCNC: 138 MG/DL
TSH SERPL DL<=0.05 MIU/L-ACNC: 1.77 UIU/ML (ref 0.45–4.5)
UIBC SERPL-MCNC: 287 UG/DL (ref 155–355)
VIT B12 SERPL-MCNC: 365 PG/ML (ref 180–914)
WBC # BLD AUTO: 5.73 THOUSAND/UL (ref 4.31–10.16)

## 2024-10-11 PROCEDURE — 82306 VITAMIN D 25 HYDROXY: CPT

## 2024-10-11 PROCEDURE — 84443 ASSAY THYROID STIM HORMONE: CPT

## 2024-10-11 PROCEDURE — 82746 ASSAY OF FOLIC ACID SERUM: CPT

## 2024-10-11 PROCEDURE — 80053 COMPREHEN METABOLIC PANEL: CPT

## 2024-10-11 PROCEDURE — 83550 IRON BINDING TEST: CPT

## 2024-10-11 PROCEDURE — 84439 ASSAY OF FREE THYROXINE: CPT

## 2024-10-11 PROCEDURE — 82728 ASSAY OF FERRITIN: CPT

## 2024-10-11 PROCEDURE — 86800 THYROGLOBULIN ANTIBODY: CPT

## 2024-10-11 PROCEDURE — 83540 ASSAY OF IRON: CPT

## 2024-10-11 PROCEDURE — 85025 COMPLETE CBC W/AUTO DIFF WBC: CPT

## 2024-10-11 PROCEDURE — 86376 MICROSOMAL ANTIBODY EACH: CPT

## 2024-10-11 PROCEDURE — 84445 ASSAY OF TSI GLOBULIN: CPT

## 2024-10-11 PROCEDURE — 84432 ASSAY OF THYROGLOBULIN: CPT

## 2024-10-11 PROCEDURE — 82607 VITAMIN B-12: CPT

## 2024-10-11 PROCEDURE — 80061 LIPID PANEL: CPT

## 2024-10-11 PROCEDURE — 83735 ASSAY OF MAGNESIUM: CPT

## 2024-10-11 PROCEDURE — 36415 COLL VENOUS BLD VENIPUNCTURE: CPT

## 2024-10-11 PROCEDURE — 83036 HEMOGLOBIN GLYCOSYLATED A1C: CPT

## 2024-10-11 PROCEDURE — 84480 ASSAY TRIIODOTHYRONINE (T3): CPT

## 2024-10-12 LAB — THYROPEROXIDASE AB SERPL-ACNC: 15 IU/ML (ref 0–34)

## 2024-10-13 LAB — TSI SER-ACNC: <0.1 IU/L (ref 0–0.55)

## 2024-10-14 ENCOUNTER — APPOINTMENT (OUTPATIENT)
Dept: LAB | Facility: CLINIC | Age: 54
End: 2024-10-14

## 2024-10-14 DIAGNOSIS — Z00.6 ENCOUNTER FOR EXAMINATION FOR NORMAL COMPARISON OR CONTROL IN CLINICAL RESEARCH PROGRAM: ICD-10-CM

## 2024-10-14 PROCEDURE — 36415 COLL VENOUS BLD VENIPUNCTURE: CPT

## 2024-10-15 RX ORDER — MULTIVIT-MIN/IRON/FOLIC ACID/K 18-600-40
2000 CAPSULE ORAL 2 TIMES DAILY
COMMUNITY

## 2024-10-16 DIAGNOSIS — E78.2 MIXED HYPERLIPIDEMIA: Primary | Chronic | ICD-10-CM

## 2024-10-16 RX ORDER — EZETIMIBE 10 MG/1
10 TABLET ORAL DAILY
Qty: 90 TABLET | Refills: 1 | Status: SHIPPED | OUTPATIENT
Start: 2024-10-16

## 2024-10-20 DIAGNOSIS — E55.9 VITAMIN D DEFICIENCY: Primary | Chronic | ICD-10-CM

## 2024-10-20 RX ORDER — ERGOCALCIFEROL 1.25 MG/1
50000 CAPSULE, LIQUID FILLED ORAL WEEKLY
Qty: 27 CAPSULE | Refills: 1 | Status: SHIPPED | OUTPATIENT
Start: 2024-10-20

## 2024-10-24 LAB
THYROGLOB AB SERPL-ACNC: 1.2 IU/ML (ref 0–0.9)
THYROGLOB SERPL-MCNC: 2 NG/ML

## 2024-10-25 LAB
APOB+LDLR+PCSK9 GENE MUT ANL BLD/T: NOT DETECTED
BRCA1+BRCA2 DEL+DUP + FULL MUT ANL BLD/T: NOT DETECTED
MLH1+MSH2+MSH6+PMS2 GN DEL+DUP+FUL M: NOT DETECTED

## 2024-11-25 ENCOUNTER — OFFICE VISIT (OUTPATIENT)
Dept: FAMILY MEDICINE CLINIC | Facility: CLINIC | Age: 54
End: 2024-11-25
Payer: COMMERCIAL

## 2024-11-25 VITALS
SYSTOLIC BLOOD PRESSURE: 108 MMHG | HEART RATE: 88 BPM | RESPIRATION RATE: 16 BRPM | WEIGHT: 147 LBS | OXYGEN SATURATION: 97 % | HEIGHT: 64 IN | DIASTOLIC BLOOD PRESSURE: 72 MMHG | TEMPERATURE: 97.8 F | BODY MASS INDEX: 25.1 KG/M2

## 2024-11-25 DIAGNOSIS — H40.1134 PRIMARY OPEN ANGLE GLAUCOMA (POAG) OF BOTH EYES, INDETERMINATE STAGE: Chronic | ICD-10-CM

## 2024-11-25 DIAGNOSIS — R73.03 PREDIABETES: Primary | Chronic | ICD-10-CM

## 2024-11-25 DIAGNOSIS — Z98.890 HISTORY OF COLONOSCOPY: Chronic | ICD-10-CM

## 2024-11-25 DIAGNOSIS — R91.8 MASS OF UPPER LOBE OF RIGHT LUNG: ICD-10-CM

## 2024-11-25 DIAGNOSIS — Z12.31 ENCOUNTER FOR SCREENING MAMMOGRAM FOR BREAST CANCER: ICD-10-CM

## 2024-11-25 DIAGNOSIS — R93.89 ABNORMAL CT OF THE CHEST: ICD-10-CM

## 2024-11-25 DIAGNOSIS — E03.8 OTHER SPECIFIED HYPOTHYROIDISM: Chronic | ICD-10-CM

## 2024-11-25 DIAGNOSIS — E78.2 MIXED HYPERLIPIDEMIA: Chronic | ICD-10-CM

## 2024-11-25 DIAGNOSIS — J32.4 CHRONIC PANSINUSITIS: Chronic | ICD-10-CM

## 2024-11-25 DIAGNOSIS — L05.91 PILONIDAL CYST: Chronic | ICD-10-CM

## 2024-11-25 DIAGNOSIS — R09.82 POST-NASAL DRIP: Chronic | ICD-10-CM

## 2024-11-25 DIAGNOSIS — Z85.038 HISTORY OF COLON CANCER: Chronic | ICD-10-CM

## 2024-11-25 DIAGNOSIS — E55.9 VITAMIN D DEFICIENCY: Chronic | ICD-10-CM

## 2024-11-25 DIAGNOSIS — J34.2 DEVIATED NASAL SEPTUM: Chronic | ICD-10-CM

## 2024-11-25 DIAGNOSIS — Z23 ENCOUNTER FOR IMMUNIZATION: ICD-10-CM

## 2024-11-25 PROBLEM — R07.9 CHEST PAIN: Status: RESOLVED | Noted: 2021-05-28 | Resolved: 2024-11-25

## 2024-11-25 PROBLEM — Z72.0 TOBACCO ABUSE: Chronic | Status: RESOLVED | Noted: 2019-03-22 | Resolved: 2024-11-25

## 2024-11-25 PROBLEM — H69.93 EUSTACHIAN TUBE DYSFUNCTION, BILATERAL: Status: RESOLVED | Noted: 2023-03-13 | Resolved: 2024-11-25

## 2024-11-25 PROBLEM — H40.9 GLAUCOMA: Chronic | Status: ACTIVE | Noted: 2021-05-28

## 2024-11-25 PROCEDURE — 99215 OFFICE O/P EST HI 40 MIN: CPT | Performed by: NURSE PRACTITIONER

## 2024-11-25 RX ORDER — LATANOPROST 50 UG/ML
1 SOLUTION/ DROPS OPHTHALMIC
COMMUNITY
Start: 2024-09-08

## 2024-11-25 RX ORDER — TIMOLOL MALEATE 5 MG/ML
1 SOLUTION/ DROPS OPHTHALMIC DAILY
COMMUNITY
Start: 2024-09-09

## 2024-11-25 NOTE — ASSESSMENT & PLAN NOTE
Has had this for years  Painful  Annoying  Referral to general surgery     Orders:    Ambulatory Referral to General Surgery; Future

## 2024-11-25 NOTE — ASSESSMENT & PLAN NOTE
8/11/2023 CT chest without contrast  FINDINGS:   Abnormal finding at the right upper lung periphery suspicious for 6 cm mass lesion.   This lesion measures 5.6 x 6.2 x 5.7 cm in the sagittal and transverse   dimensions.   Located at the approximate ivon level.   It is contiguous with the right pleural surface laterally.   Slice axial image 35 shows some extension into the left lower lobe superior segment region; some of which could be pneumonia   but I cannot exclude some mass extension is well by axial image 53-series 4.   It contains a small 13 mm cavitated focus containing air.    Its medial margin lies within 2 cm of the hilar area.   No additional lung finding bilaterally.   No pleural effusion bilaterally.   No appreciable emphysematous change and no bulla.   No adenopathy of the mediastinum, estuardo or axillae.    Unremarkable airways.   Unremarkable heart showing no coronary artery calcification, cardiomegaly or pericardial effusion.   The thoracic aorta shows no aneurysmal dilatation.   Unremarkable visualized thyroid, esophagus and bones.  (The far upper abdomen is included. Surgical clips noted. No additional finding.)   IMPRESSION:       CT chest.     Abnormal finding at the right upper lung periphery, consistent with mass lesion, 5.6 x 6.2 x 5.7 cm.   It is contiguous with the right pleural surface laterally and extends to within 2 cm of the hilar area.   It shows some extension into the left lower lobe superior segment region; some of which could be pneumonia but I cannot exclude some mass extension as well.   It contains a small 13 mm cavitated focus.    No additional lung finding bilaterally.   No chest adenopathy.       11/25/2024  CT chest ordered by her oncologist and oncologist appointment for 2/28/2025.    Orders:    CBC and differential; Future    Comprehensive metabolic panel; Future

## 2024-11-25 NOTE — PROGRESS NOTES
Name: Deb Flowers      : 1970      MRN: [id number]  Encounter Provider: ADAIR Reis  Encounter Date: 2024   [facility]  :  Assessment & Plan  Encounter for screening mammogram for breast cancer         Encounter for immunization         Abnormal CT of the chest  2023 CT chest without contrast  FINDINGS:   Abnormal finding at the right upper lung periphery suspicious for 6 cm mass lesion.   This lesion measures 5.6 x 6.2 x 5.7 cm in the sagittal and transverse   dimensions.   Located at the approximate ivon level.   It is contiguous with the right pleural surface laterally.   Slice axial image 35 shows some extension into the left lower lobe superior segment region; some of which could be pneumonia   but I cannot exclude some mass extension is well by axial image 53-series 4.   It contains a small 13 mm cavitated focus containing air.    Its medial margin lies within 2 cm of the hilar area.   No additional lung finding bilaterally.   No pleural effusion bilaterally.   No appreciable emphysematous change and no bulla.   No adenopathy of the mediastinum, estuardo or axillae.    Unremarkable airways.   Unremarkable heart showing no coronary artery calcification, cardiomegaly or pericardial effusion.   The thoracic aorta shows no aneurysmal dilatation.   Unremarkable visualized thyroid, esophagus and bones.  (The far upper abdomen is included. Surgical clips noted. No additional finding.)   IMPRESSION:       CT chest.     Abnormal finding at the right upper lung periphery, consistent with mass lesion, 5.6 x 6.2 x 5.7 cm.   It is contiguous with the right pleural surface laterally and extends to within 2 cm of the hilar area.   It shows some extension into the left lower lobe superior segment region; some of which could be pneumonia but I cannot exclude some mass extension as well.   It contains a small 13 mm cavitated focus.    No additional  lung finding bilaterally.   No chest adenopathy.          Mass of upper lobe of right lung  8/11/2023 CT chest without contrast  FINDINGS:   Abnormal finding at the right upper lung periphery suspicious for 6 cm mass lesion.   This lesion measures 5.6 x 6.2 x 5.7 cm in the sagittal and transverse   dimensions.   Located at the approximate ivon level.   It is contiguous with the right pleural surface laterally.   Slice axial image 35 shows some extension into the left lower lobe superior segment region; some of which could be pneumonia   but I cannot exclude some mass extension is well by axial image 53-series 4.   It contains a small 13 mm cavitated focus containing air.    Its medial margin lies within 2 cm of the hilar area.   No additional lung finding bilaterally.   No pleural effusion bilaterally.   No appreciable emphysematous change and no bulla.   No adenopathy of the mediastinum, estuardo or axillae.    Unremarkable airways.   Unremarkable heart showing no coronary artery calcification, cardiomegaly or pericardial effusion.   The thoracic aorta shows no aneurysmal dilatation.   Unremarkable visualized thyroid, esophagus and bones.  (The far upper abdomen is included. Surgical clips noted. No additional finding.)   IMPRESSION:       CT chest.     Abnormal finding at the right upper lung periphery, consistent with mass lesion, 5.6 x 6.2 x 5.7 cm.   It is contiguous with the right pleural surface laterally and extends to within 2 cm of the hilar area.   It shows some extension into the left lower lobe superior segment region; some of which could be pneumonia but I cannot exclude some mass extension as well.   It contains a small 13 mm cavitated focus.    No additional lung finding bilaterally.   No chest adenopathy.       11/25/2024  CT chest ordered by her oncologist and oncologist appointment for 2/28/2025.    Orders:    CBC and differential; Future    Comprehensive metabolic panel; Future    Prediabetes           Component  Ref Range & Units (hover) 10/11/24  7:32 AM 5/3/24  7:22 AM 6/20/23 12:00 AM 12/12/22 12:00 AM 6/10/22 12:00 AM   Hemoglobin A1C 5.9 High  5.9 High  5.8 High  R, CM 6.0 High  R, CM 5.7 High  R, CM    123 120 R 126 R 117     This is a chronic disease process.   The patient is unstable at this time.  We discussed diet and exercise.  Will monitor labs.  Continue  Zetia  Omega-3 fish oil  Crestor     Orders:    Hemoglobin A1C; Future    Mixed hyperlipidemia  Component  Ref Range & Units (hover) 10/11/24  7:32 AM 3/1/24  7:41 AM   Cholesterol 212 High  173 CM   Comment: Cholesterol:        Pediatric <18 Years        Desirable          <170 mg/dL      Borderline High    170-199 mg/dL      High               >=200 mg/dL        Adult >=18 Years           Desirable         <200 mg/dL      Borderline High   200-239 mg/dL      High             >239 mg/dL   Triglycerides 138 139 CM   Comment: Triglyceride:     0-9Y            <75mg/dL     10Y-17Y         <90 mg/dL       >=18Y     Normal          <150 mg/dL     Borderline High 150-199 mg/dL     High            200-499 mg/dL       Very High       >499 mg/dL    Specimen collection should occur prior to Metamizole administration due to the potential for falsely depressed results.   HDL, Direct 55 57   LDL Calculated 129 High  88 CM   Comment: LDL Cholesterol:    Optimal           <100 mg/dl    Near Optimal      100-129 mg/dl    Above Optimal      Borderline High 130-159 mg/dl      High            160-189 mg/dl      Very High       >189 mg/dl     This is a chronic disease process.   The patient is stable at this time.  We discussed diet and exercise.  Will monitor labs.  Continue  Zetia  Omega-3 fish oil  Crestor     Orders:    Lipid Panel with Direct LDL reflex; Future    Vitamin D deficiency        Component  Ref Range & Units (hover) 10/11/24  7:32 AM 7/2/24  8:31 AM 4/9/24 10:12 AM   Vit D, 25-Hydroxy 34.9 51.4 CM 25.1 Low       This is a chronic disease  process  Patient should continue both vitamin D supplements  Vitamin D3 4000 units daily  Vitamin D2 50,000 units weekly  We will monitor her labs    Orders:    Vitamin D 25 hydroxy; Future    History of colon cancer         Primary open angle glaucoma (POAG) of both eyes, indeterminate stage         Other specified hypothyroidism  This is a chronic disease process.   The patient is stable at this time.  We discussed diet and exercise.  Will monitor labs.  Continue  Levothyroxine     Orders:    Lipid Panel with Direct LDL reflex; Future    TSH, 3rd generation with Free T4 reflex; Future    Deviated nasal septum         Chronic pansinusitis         History of colonoscopy  9/23/2024  FINDINGS:  One sessile and flat polyp measuring 20 mm or greater in the ascending colon; lift performed with 18 mL of Elevue injected into the submucosa; completely removed target lesion in piecemeal fashion by EMR and retrieved specimen. Clear-cut demarcation of the lesion was performed prior to procedure. EMR was performed with a hot snare. Post-procedure bleeding was visualized; placed 4 clips successfully (clips are MRI conditional); hemostasis achieved  External and internal small hemorrhoids  IMPRESSION:  One polyp measuring 20 mm or greater in the ascending colon; lift performed; removed in piecemeal fashion by EMR, post-procedure bleeding was visualized; placed 4 clips successfully; hemostasis achieved  Small hemorrhoids   RECOMMENDATION:  Repeat colonoscopy in 6 months, due: 3/22/2025  Personal history of colon polyps  Personal history of colon cancer      Await pathology results   Increase fiber in diet.      Repeat colonoscopy in 6 months, due: 3/22/2025          Post-nasal drip         Pilonidal cyst  Has had this for years  Painful  Annoying  Referral to general surgery     Orders:    Ambulatory Referral to General Surgery; Future           History of Present Illness     The patient is here today to establish care with this  provider.  We also discussed her medications and current treatment plans.  We discussed the importance of her to have a follow-up CT scan of her chest.   I reviewed their medical conditions, medications, laboratory and radiology studies.  I reviewed their scheduled future lab studies with the patient.   The patient's current treatment plan is effective.   There is no change in the current therapy.   I ask the patient to continue their current therapy.   The patient voiced their understanding and agreement.   Follow up after 3/4/2025 for her annual physical.  Please continue to the PORCH section of the note for details of today's visit.             Review of Systems   Constitutional:  Negative for activity change, chills, fatigue and fever.   HENT:  Negative for rhinorrhea and sore throat.    Eyes:  Negative for pain.   Respiratory:  Negative for cough and shortness of breath.    Cardiovascular:  Negative for chest pain, palpitations and leg swelling.   Gastrointestinal:  Negative for abdominal pain, constipation, diarrhea, nausea and vomiting.   Genitourinary:  Negative for difficulty urinating, flank pain, frequency and urgency.   Musculoskeletal:  Negative for gait problem, joint swelling and myalgias.   Skin:  Positive for wound. Negative for color change.   Neurological:  Negative for dizziness, weakness, light-headedness and headaches.   Psychiatric/Behavioral:  Negative for sleep disturbance. The patient is not nervous/anxious.    All other systems reviewed and are negative.    Current Outpatient Medications on File Prior to Visit   Medication Sig Dispense Refill    Cholecalciferol (Vitamin D) 50 MCG (2000 UT) CAPS Take 2,000 Units by mouth 2 (two) times a day      cyclobenzaprine (FLEXERIL) 5 mg tablet Take 1 tablet (5 mg total) by mouth 3 (three) times a day as needed for muscle spasms 30 tablet 0    ergocalciferol (VITAMIN D2) 50,000 units Take 1 capsule (50,000 Units total) by mouth once a week 27 capsule  "1    ezetimibe (ZETIA) 10 mg tablet Take 1 tablet (10 mg total) by mouth daily 90 tablet 1    latanoprost (XALATAN) 0.005 % ophthalmic solution Administer 1 drop to both eyes daily at bedtime      levothyroxine (Euthyrox) 50 mcg tablet Take 1 tablet (50 mcg total) by mouth daily in the early morning 90 tablet 1    Multiple Vitamins-Minerals (CENTRUM ADULTS PO) Take 1 tablet by mouth in the morning      naproxen sodium (ALEVE) 220 MG tablet Take 220 mg by mouth every 12 (twelve) hours as needed      Omega-3 Fatty Acids (Omega-3 Fish Oil) 1000 MG CAPS Take by mouth      rosuvastatin (CRESTOR) 10 MG tablet TAKE 1 TABLET BY MOUTH EVERY DAY AT NIGHT      timolol (TIMOPTIC) 0.5 % ophthalmic solution Administer 1 drop to both eyes daily      [DISCONTINUED] bisacodyl (DULCOLAX) 5 mg EC tablet Take 4 tablets (20 mg total) by mouth once for 1 dose Colonoscopy prep (Patient not taking: Reported on 11/25/2024) 4 tablet 0    [DISCONTINUED] polyethylene glycol (GOLYTELY) 4000 mL solution Take 4,000 mL by mouth once for 1 dose Colonoscopy prep (Patient not taking: Reported on 11/25/2024) 4000 mL 0     No current facility-administered medications on file prior to visit.         Objective   /72 (BP Location: Left arm, Patient Position: Sitting, Cuff Size: Standard)   Pulse 88   Temp 97.8 °F (36.6 °C) (Tympanic)   Resp 16   Ht 5' 4\" (1.626 m)   Wt 66.7 kg (147 lb)   LMP  (LMP Unknown) Comment: 06/2023  SpO2 97%   BMI 25.23 kg/m²      Physical Exam  Vitals and nursing note reviewed.   Constitutional:       General: She is awake. She is not in acute distress.     Appearance: Normal appearance. She is well-developed.   HENT:      Head: Normocephalic and atraumatic.      Nose: Nose normal.      Mouth/Throat:      Mouth: Mucous membranes are moist.   Eyes:      Conjunctiva/sclera: Conjunctivae normal.   Cardiovascular:      Rate and Rhythm: Normal rate and regular rhythm.      Pulses: Normal pulses.      Heart sounds: Normal " heart sounds. No murmur heard.  Pulmonary:      Effort: Pulmonary effort is normal. No respiratory distress.      Breath sounds: Normal breath sounds.   Abdominal:      General: Bowel sounds are normal.      Palpations: Abdomen is soft.      Tenderness: There is no abdominal tenderness.   Musculoskeletal:      Cervical back: Neck supple.      Right lower leg: No edema.      Left lower leg: No edema.   Skin:     General: Skin is warm and dry.          Neurological:      Mental Status: She is alert and oriented to person, place, and time.   Psychiatric:         Attention and Perception: Attention normal.         Mood and Affect: Mood normal.         Speech: Speech normal.         Behavior: Behavior normal. Behavior is cooperative.         Thought Content: Thought content normal.         Cognition and Memory: Cognition normal.         Judgment: Judgment normal.       Administrative Statements   I have spent a total time of 45 minutes in caring for this patient on the day of the visit/encounter including Diagnostic results, Prognosis, Risks and benefits of tx options, Instructions for management, Patient and family education, Importance of tx compliance, Risk factor reductions, Impressions, Counseling / Coordination of care, Documenting in the medical record, Reviewing / ordering tests, medicine, procedures  , and Obtaining or reviewing history  . Topics discussed with the patient / family include symptom assessment and management, medication review, and psychosocial support.

## 2024-11-25 NOTE — ASSESSMENT & PLAN NOTE
This is a chronic disease process.   The patient is stable at this time.  We discussed diet and exercise.  Will monitor labs.  Continue  Levothyroxine     Orders:    Lipid Panel with Direct LDL reflex; Future    TSH, 3rd generation with Free T4 reflex; Future

## 2024-11-25 NOTE — ASSESSMENT & PLAN NOTE
9/23/2024  FINDINGS:  One sessile and flat polyp measuring 20 mm or greater in the ascending colon; lift performed with 18 mL of Elevue injected into the submucosa; completely removed target lesion in piecemeal fashion by EMR and retrieved specimen. Clear-cut demarcation of the lesion was performed prior to procedure. EMR was performed with a hot snare. Post-procedure bleeding was visualized; placed 4 clips successfully (clips are MRI conditional); hemostasis achieved  External and internal small hemorrhoids  IMPRESSION:  One polyp measuring 20 mm or greater in the ascending colon; lift performed; removed in piecemeal fashion by EMR, post-procedure bleeding was visualized; placed 4 clips successfully; hemostasis achieved  Small hemorrhoids   RECOMMENDATION:  Repeat colonoscopy in 6 months, due: 3/22/2025  Personal history of colon polyps  Personal history of colon cancer      Await pathology results   Increase fiber in diet.      Repeat colonoscopy in 6 months, due: 3/22/2025

## 2024-11-25 NOTE — ASSESSMENT & PLAN NOTE
Component  Ref Range & Units (hover) 10/11/24  7:32 AM 5/3/24  7:22 AM 6/20/23 12:00 AM 12/12/22 12:00 AM 6/10/22 12:00 AM   Hemoglobin A1C 5.9 High  5.9 High  5.8 High  R, CM 6.0 High  R, CM 5.7 High  R, CM    123 120 R 126 R 117     This is a chronic disease process.   The patient is unstable at this time.  We discussed diet and exercise.  Will monitor labs.  Continue  Zetia  Omega-3 fish oil  Crestor     Orders:    Hemoglobin A1C; Future

## 2024-11-25 NOTE — ASSESSMENT & PLAN NOTE
Component  Ref Range & Units (hover) 10/11/24  7:32 AM 3/1/24  7:41 AM   Cholesterol 212 High  173 CM   Comment: Cholesterol:        Pediatric <18 Years        Desirable          <170 mg/dL      Borderline High    170-199 mg/dL      High               >=200 mg/dL        Adult >=18 Years           Desirable         <200 mg/dL      Borderline High   200-239 mg/dL      High             >239 mg/dL   Triglycerides 138 139 CM   Comment: Triglyceride:     0-9Y            <75mg/dL     10Y-17Y         <90 mg/dL       >=18Y     Normal          <150 mg/dL     Borderline High 150-199 mg/dL     High            200-499 mg/dL       Very High       >499 mg/dL    Specimen collection should occur prior to Metamizole administration due to the potential for falsely depressed results.   HDL, Direct 55 57   LDL Calculated 129 High  88 CM   Comment: LDL Cholesterol:    Optimal           <100 mg/dl    Near Optimal      100-129 mg/dl    Above Optimal      Borderline High 130-159 mg/dl      High            160-189 mg/dl      Very High       >189 mg/dl     This is a chronic disease process.   The patient is stable at this time.  We discussed diet and exercise.  Will monitor labs.  Continue  Zetia  Omega-3 fish oil  Crestor     Orders:    Lipid Panel with Direct LDL reflex; Future

## 2024-11-25 NOTE — ASSESSMENT & PLAN NOTE
Component  Ref Range & Units (hover) 10/11/24  7:32 AM 7/2/24  8:31 AM 4/9/24 10:12 AM   Vit D, 25-Hydroxy 34.9 51.4 CM 25.1 Low       This is a chronic disease process  Patient should continue both vitamin D supplements  Vitamin D3 4000 units daily  Vitamin D2 50,000 units weekly  We will monitor her labs    Orders:    Vitamin D 25 hydroxy; Future

## 2024-11-25 NOTE — ASSESSMENT & PLAN NOTE
8/11/2023 CT chest without contrast  FINDINGS:   Abnormal finding at the right upper lung periphery suspicious for 6 cm mass lesion.   This lesion measures 5.6 x 6.2 x 5.7 cm in the sagittal and transverse   dimensions.   Located at the approximate ivon level.   It is contiguous with the right pleural surface laterally.   Slice axial image 35 shows some extension into the left lower lobe superior segment region; some of which could be pneumonia   but I cannot exclude some mass extension is well by axial image 53-series 4.   It contains a small 13 mm cavitated focus containing air.    Its medial margin lies within 2 cm of the hilar area.   No additional lung finding bilaterally.   No pleural effusion bilaterally.   No appreciable emphysematous change and no bulla.   No adenopathy of the mediastinum, estuardo or axillae.    Unremarkable airways.   Unremarkable heart showing no coronary artery calcification, cardiomegaly or pericardial effusion.   The thoracic aorta shows no aneurysmal dilatation.   Unremarkable visualized thyroid, esophagus and bones.  (The far upper abdomen is included. Surgical clips noted. No additional finding.)   IMPRESSION:       CT chest.     Abnormal finding at the right upper lung periphery, consistent with mass lesion, 5.6 x 6.2 x 5.7 cm.   It is contiguous with the right pleural surface laterally and extends to within 2 cm of the hilar area.   It shows some extension into the left lower lobe superior segment region; some of which could be pneumonia but I cannot exclude some mass extension as well.   It contains a small 13 mm cavitated focus.    No additional lung finding bilaterally.   No chest adenopathy.

## 2024-11-25 NOTE — PATIENT INSTRUCTIONS
_________________________________________________________________  YOU ARE DUE FOR YOUR ANNUAL PHYSICAL EXAM AFTER 3/4/2025.  REPEAT LABS ORDERED, PLEASE HAVE THEM DRAWN THE WEEK PRIOR TO YOUR VISIT (APPROXIMATELY 2/25/2025).  LABS HAVE BEEN ORDERED FOR YOU.   THESE LABS SHOULD BE DRAWN PRIOR TO YOUR NEXT VISIT.  YOU CAN HAVE THEM DRAWN UP TO 1 WEEK PRIOR TO YOUR NEXT VISIT.  HAVING YOUR BLOOD WORK WHEN YOU COME TO YOUR NEXT VISIT WILL ALLOW ME TO PROVIDE THE BEST MEDICAL CARE FOR YOU WITHOUT HAVING EITHER OF US PERFORM MORE WORK THAN NECESSARY.  PLEASE BE COMPLIANT WITH THIS REQUEST.   _____________________________________________________________________

## 2025-01-06 ENCOUNTER — OFFICE VISIT (OUTPATIENT)
Dept: SURGERY | Facility: CLINIC | Age: 55
End: 2025-01-06
Payer: COMMERCIAL

## 2025-01-06 VITALS
RESPIRATION RATE: 16 BRPM | BODY MASS INDEX: 25.71 KG/M2 | TEMPERATURE: 98.1 F | OXYGEN SATURATION: 99 % | WEIGHT: 150.6 LBS | SYSTOLIC BLOOD PRESSURE: 118 MMHG | HEART RATE: 90 BPM | HEIGHT: 64 IN | DIASTOLIC BLOOD PRESSURE: 72 MMHG

## 2025-01-06 DIAGNOSIS — L72.9 SKIN CYSTS, GENERALIZED: ICD-10-CM

## 2025-01-06 DIAGNOSIS — L05.91 PILONIDAL CYST: Primary | Chronic | ICD-10-CM

## 2025-01-06 PROCEDURE — 99203 OFFICE O/P NEW LOW 30 MIN: CPT | Performed by: SURGERY

## 2025-01-06 NOTE — PROGRESS NOTES
Consult - General Surgery   Deb Flowers 54 y.o. female MRN: [id number]  Encounter: [id number]    Assessment & Plan     54F with history of colon cancer metastatic to the liver [place] [facility] [place], continues surveillance colonoscopy and visits, with longstanding multifocal skin cysts. She has had right groin crease, scalp, and buttock cysts for many years. The sites are currently quiescent.     There is a 2cm chronic cyst with chronic inflammatory change at the upper inner portion of the left buttock. No pitting or coarse hair or other definitive signs of pilonidal disease. We discussed that this buttock cyst may be pilonidal in origin or it may not be. She has mild disease if if is a pilonidal. We did review images together of more complex pilonidal disease and what to look out for. We discussed resection options, leaving the site open, packing, VAC dressings, combined procedures with plastics, flap coverage.    I advise ongoing expectant management of the site with warm compresses, tylenol, ibuprofen during flares. She can contact me as needed for help with management of flares, antibiotic therapy, repeat exams, etc. I do not recommend wide excision of the area at this point and the patient is in agreement.      History of Present Illness   Chief Complaint   Patient presents with    Cyst     Years of intermittent symptoms at left upper inner buttock cyst. Has had scalp cysts, groin cysts, many courses of antibiotics over the years, she manages them on her own with compresses and drawing salve, cody tree oil. Has not had I and D or excisions. The one on her buttock will intermittently drain. Right now no sites are in a flare. Has been a long time since she needed antibiotics for a flare. Does not follow with derm. No family or personal history of skin cancer. Prior smoker, now quit, stage 4 colon CA to liver, had colon and liver resections, chemo, follows with [facility]/GI,  "recent colonoscopy with polyp, March 2025 follow up. Had complicated PNA/sinus issues in recent years.        Review of Systems   Constitutional: Negative.    Skin:         Chronic inflammatory cysts left buttock near gluteal cleft, bilateral inguinal regions right more than left   All other systems reviewed and are negative.      Historical Information   Past Medical History:   Diagnosis Date    Cancer (HCC) 2005    Colon CA stage 4    Colon cancer (HCC)     Disease of thyroid gland     Glaucoma     Hypothyroidism      Past Surgical History:   Procedure Laterality Date    LIVER SURGERY      RIGHT COLON RESECTION      chemo    SINUS SURGERY       Social History   Social History     Substance and Sexual Activity   Alcohol Use Yes    Comment: social     Social History     Substance and Sexual Activity   Drug Use Never     Social History     Tobacco Use   Smoking Status Former    Types: Cigarettes    Passive exposure: Current   Smokeless Tobacco Never   Tobacco Comments    Quit \"August 2023\".     Family History   Problem Relation Age of Onset    Heart disease Mother     Heart attack Mother     Hypertension Mother     Hyperlipidemia Mother     Diabetes Mother     Cancer Father         bladder    Hypertension Father     Hyperlipidemia Father     Heart attack Father     Heart disease Father     Diabetes Father     Prostate cancer Father     Heart disease Maternal Grandmother        Meds/Allergies     Current Outpatient Medications:     cyclobenzaprine (FLEXERIL) 5 mg tablet, Take 1 tablet (5 mg total) by mouth 3 (three) times a day as needed for muscle spasms, Disp: 30 tablet, Rfl: 0    ergocalciferol (VITAMIN D2) 50,000 units, Take 1 capsule (50,000 Units total) by mouth once a week, Disp: 27 capsule, Rfl: 1    ezetimibe (ZETIA) 10 mg tablet, Take 1 tablet (10 mg total) by mouth daily, Disp: 90 tablet, Rfl: 1    latanoprost (XALATAN) 0.005 % ophthalmic solution, Administer 1 drop to both eyes daily at bedtime, Disp: , " "Rfl:     levothyroxine (Euthyrox) 50 mcg tablet, Take 1 tablet (50 mcg total) by mouth daily in the early morning, Disp: 90 tablet, Rfl: 1    Multiple Vitamins-Minerals (CENTRUM ADULTS PO), Take 1 tablet by mouth in the morning, Disp: , Rfl:     naproxen sodium (ALEVE) 220 MG tablet, Take 220 mg by mouth every 12 (twelve) hours as needed, Disp: , Rfl:     timolol (TIMOPTIC) 0.5 % ophthalmic solution, Administer 1 drop to both eyes daily, Disp: , Rfl:     Cholecalciferol (Vitamin D) 50 MCG (2000 UT) CAPS, Take 2,000 Units by mouth 2 (two) times a day, Disp: , Rfl:     Omega-3 Fatty Acids (Omega-3 Fish Oil) 1000 MG CAPS, Take by mouth, Disp: , Rfl:     rosuvastatin (CRESTOR) 10 MG tablet, TAKE 1 TABLET BY MOUTH EVERY DAY AT NIGHT, Disp: , Rfl:   Allergies   Allergen Reactions    Cetirizine GI Intolerance     Abdominal pain    Clarithromycin      Other reaction(s): GI Upset    Iodinated Contrast Media     Morphine      Other reaction(s): GI Upset    Other Hives       The following portions of the patient's history were reviewed and updated as appropriate: allergies, current medications, past family history, past medical history, past social history, past surgical history, and problem list.    Objective   Current Vitals:   Blood pressure 118/72, pulse 90, temperature 98.1 °F (36.7 °C), temperature source Temporal, resp. rate 16, height 5' 4\" (1.626 m), weight 68.3 kg (150 lb 9.6 oz), SpO2 99%.    Physical Exam  Vitals reviewed.   Constitutional:       General: She is not in acute distress.     Appearance: She is not toxic-appearing.   HENT:      Head: Normocephalic.      Nose: No congestion.      Mouth/Throat:      Mouth: Mucous membranes are moist.   Eyes:      Pupils: Pupils are equal, round, and reactive to light.   Cardiovascular:      Rate and Rhythm: Normal rate.   Pulmonary:      Effort: Pulmonary effort is normal. No respiratory distress.   Abdominal:      Palpations: Abdomen is soft.   Musculoskeletal:         " General: Normal range of motion.      Cervical back: Normal range of motion and neck supple.   Lymphadenopathy:      Cervical: No cervical adenopathy.   Skin:     General: Skin is warm and dry.      Capillary Refill: Capillary refill takes less than 2 seconds.      Findings: Lesion present. No erythema.      Comments: 2cm left upper inner buttock chronic cyst, quiescent, no acute cellulitis or infection, no pitting, no hair    Bilateral inguinal crease chronic cysts/scars, nothing acutely inflamed or infected   Neurological:      General: No focal deficit present.      Mental Status: She is alert.   Psychiatric:         Mood and Affect: Mood normal.         Signature:  Monico Joseph MD  Date: 1/6/2025 Time: 8:45 AM

## 2025-01-13 DIAGNOSIS — E78.2 MIXED HYPERLIPIDEMIA: Chronic | ICD-10-CM

## 2025-01-14 DIAGNOSIS — E03.9 ACQUIRED HYPOTHYROIDISM: ICD-10-CM

## 2025-01-14 RX ORDER — LEVOTHYROXINE SODIUM 50 UG/1
50 TABLET ORAL
Qty: 90 TABLET | Refills: 1 | Status: SHIPPED | OUTPATIENT
Start: 2025-01-14

## 2025-01-14 RX ORDER — EZETIMIBE 10 MG/1
10 TABLET ORAL DAILY
Qty: 90 TABLET | Refills: 1 | Status: SHIPPED | OUTPATIENT
Start: 2025-01-14

## 2025-01-14 NOTE — TELEPHONE ENCOUNTER
Reason for call:   [x] Refill   [] Prior Auth  [] Other:     Office:   [x] PCP/Provider - Esau STRAUSS / Ad    Medication: levothyroxine    Dose/Frequency: 50mcg qam    Quantity: 90    Pharmacy: [facility]/pharmacy #[id number] - [place], [place] - [id number]     Does the patient have enough for 3 days?   [] Yes   [x] No - Send as HP to POD

## 2025-01-22 ENCOUNTER — TELEPHONE (OUTPATIENT)
Age: 55
End: 2025-01-22

## 2025-01-22 NOTE — TELEPHONE ENCOUNTER
Pt was at the pharmacy to  her Vit d and was told by the pharmacy that it has blue dye in it, (she is allergic) and the pharmacy told her to contact the office. She will be calling the pharmacy tomorrow to speak to the pharmacist.

## 2025-02-07 DIAGNOSIS — E55.9 VITAMIN D DEFICIENCY: Chronic | ICD-10-CM

## 2025-02-07 RX ORDER — ERGOCALCIFEROL 1.25 MG/1
50000 CAPSULE, LIQUID FILLED ORAL WEEKLY
Qty: 27 CAPSULE | Refills: 1 | Status: SHIPPED | OUTPATIENT
Start: 2025-02-07

## 2025-02-19 ENCOUNTER — OFFICE VISIT (OUTPATIENT)
Dept: URGENT CARE | Facility: CLINIC | Age: 55
End: 2025-02-19
Payer: COMMERCIAL

## 2025-02-19 VITALS
HEART RATE: 62 BPM | SYSTOLIC BLOOD PRESSURE: 132 MMHG | RESPIRATION RATE: 20 BRPM | OXYGEN SATURATION: 99 % | DIASTOLIC BLOOD PRESSURE: 63 MMHG | TEMPERATURE: 97.9 F

## 2025-02-19 DIAGNOSIS — Z20.828 CONTACT WITH AND (SUSPECTED) EXPOSURE TO OTHER VIRAL COMMUNICABLE DISEASES: ICD-10-CM

## 2025-02-19 DIAGNOSIS — J06.9 URI WITH COUGH AND CONGESTION: Primary | ICD-10-CM

## 2025-02-19 DIAGNOSIS — J02.8 ACUTE PHARYNGITIS DUE TO OTHER SPECIFIED ORGANISMS: ICD-10-CM

## 2025-02-19 LAB — S PYO AG THROAT QL: NEGATIVE

## 2025-02-19 PROCEDURE — 87636 SARSCOV2 & INF A&B AMP PRB: CPT | Performed by: NURSE PRACTITIONER

## 2025-02-19 PROCEDURE — 99214 OFFICE O/P EST MOD 30 MIN: CPT | Performed by: NURSE PRACTITIONER

## 2025-02-19 PROCEDURE — 87880 STREP A ASSAY W/OPTIC: CPT | Performed by: NURSE PRACTITIONER

## 2025-02-19 PROCEDURE — S9088 SERVICES PROVIDED IN URGENT: HCPCS | Performed by: NURSE PRACTITIONER

## 2025-02-19 RX ORDER — PREDNISONE 10 MG/1
TABLET ORAL
Qty: 24 TABLET | Refills: 0 | Status: SHIPPED | OUTPATIENT
Start: 2025-02-19

## 2025-02-19 NOTE — PATIENT INSTRUCTIONS
You are to have your CT scan done next week as scheduled.   Use saline nasal spray for nasal congestion.  Use a cool mist humidifier,   wear a mask to protect your    Your covid and flu test will be back in 24-48 hours.  Check mychart for results.   You will be notified if abnormal and you haven't checked    Your strep was negative.    Follow up with your PCP in 3-5 days  Go to the ED if symptoms worsen     If tests have been performed at [facility], our office will contact you with results if changes need to be made to the care plan discussed with you at the visit.  You can [facility] full [facility].    You have been prescribed prednisone. Take with food. Do NOT take any NSAID products (motrin, ibuprofen, aleve, advil) while taking this medication.  You may take tylenol.     Your strep A is negative. You are to do warm salt water gargles 4 x daily.  Drink warm tea with honey and lemon.  Take tylenol or motrin as able for pain or fever.  Chloraseptic throat spray, cough drops.  Do not share utensils.  Change your tooth brush in 3 days.  Follow up with your PCP in 2-3 days  Go to the ED if symptoms worsen

## 2025-02-19 NOTE — PROGRESS NOTES
[facility]        NAME: Deb Flowers is a 54 y.o. female  : 1970    MRN: [id number]  DATE: 2025  TIME: 12:32 PM    Assessment and Plan   URI with cough and congestion [J06.9]  1. URI with cough and congestion  predniSONE 10 mg tablet      2. Contact with and (suspected) exposure to other viral communicable diseases  Covid/Flu- Office Collect Normal    Covid/Flu- Office Collect Normal    CANCELED: XR chest pa and lateral      3. Acute pharyngitis due to other specified organisms  POCT rapid ANTIGEN strepA            Patient Instructions       Follow up with PCP in 3-5 days.  Proceed to  ER if symptoms worsen.    If tests have been performed at [facility], our office will contact you with results if changes need to be made to the care plan discussed with you at the visit.  [facility]    You are to have your CT scan done next week as scheduled.   Use saline nasal spray for nasal congestion.  Use a cool mist humidifier,   wear a mask to protect your    Your covid and flu test will be back in 24-48 hours.  Check mychart for results.   You will be notified if abnormal and you haven't checked    Your strep was negative.    Follow up with your PCP in 3-5 days  Go to the ED if symptoms worsen     If tests have been performed at [facility], our office will contact you with results if changes need to be made to the care plan discussed with you at the visit.  [facility]    You have been prescribed prednisone. Take with food. Do NOT take any NSAID products (motrin, ibuprofen, aleve, advil) while taking this medication.  You may take tylenol.     Your strep A is negative. You are to do warm salt water gargles 4 x daily.  Drink warm tea with honey and lemon.  Take tylenol or motrin as able for pain or fever.  Chloraseptic throat spray, cough drops.  Do not share utensils.  Change your tooth brush in 3  days.  Follow up with your PCP in 2-3 days  Go to the ED if symptoms worsen        Chief Complaint     Chief Complaint   Patient presents with    Earache    Sore Throat     Covid exposure         History of Present Illness       This is a 54 year old female who comes to [facility] with c/o right ear pain, right eye pain and sorethroat mostly on the right side that started this morning. She is unsure if she has been flu vaccinated and states her daughter's  is covid + and she was around the daughter more than the . She is requesting testing as her  has stage 4 lung cancer.     She denies taking anything for her symptoms today.  She denies fevers, chills, n/v/d. She states she has had a cough x 2 years.    [facility] here at [facility] and had an abnormal CXR - she states she did have CT scan at [facility] and it was fine.  She states he has hx of coloncancer so they check her every 2 years. She states she is due 3/28/25 for her CT scan.  She states ever since her nasal surgery she has had a productive cough.  She states this is nothing more than normal for her.  PMH is listed and reviewed.   She is concerned she needs a CXR.     Earache   Associated symptoms include coughing and a sore throat.   Sore Throat   Associated symptoms include coughing and ear pain.       Review of Systems   Review of Systems   Constitutional: Negative.    HENT:  Positive for ear pain and sore throat.    Eyes:  Positive for pain. Negative for photophobia, discharge, redness, itching and visual disturbance.   Respiratory:  Positive for cough.    Cardiovascular: Negative.    Gastrointestinal: Negative.    Endocrine: Negative.    Genitourinary: Negative.    Musculoskeletal: Negative.    Skin: Negative.    Allergic/Immunologic: Negative.    Neurological: Negative.    Hematological: Negative.    Psychiatric/Behavioral: Negative.           Current Medications       Current Outpatient Medications:     cyclobenzaprine  (FLEXERIL) 5 mg tablet, Take 1 tablet (5 mg total) by mouth 3 (three) times a day as needed for muscle spasms, Disp: 30 tablet, Rfl: 0    ezetimibe (ZETIA) 10 mg tablet, Take 1 tablet (10 mg total) by mouth daily, Disp: 90 tablet, Rfl: 1    latanoprost (XALATAN) 0.005 % ophthalmic solution, Administer 1 drop to both eyes daily at bedtime, Disp: , Rfl:     levothyroxine (Euthyrox) 50 mcg tablet, Take 1 tablet (50 mcg total) by mouth daily in the early morning, Disp: 90 tablet, Rfl: 1    Multiple Vitamins-Minerals (CENTRUM ADULTS PO), Take 1 tablet by mouth in the morning, Disp: , Rfl:     naproxen sodium (ALEVE) 220 MG tablet, Take 220 mg by mouth every 12 (twelve) hours as needed, Disp: , Rfl:     predniSONE 10 mg tablet, Take 5 tabs po x 2 days; 4 tabs po x 2 days; 3 tabs po x 1 day; 2 tabs po x 1 day. 1 tab po x 1 day., Disp: 24 tablet, Rfl: 0    timolol (TIMOPTIC) 0.5 % ophthalmic solution, Administer 1 drop to both eyes daily, Disp: , Rfl:     ergocalciferol (VITAMIN D2) 50,000 units, Take 1 capsule (50,000 Units total) by mouth once a week (Patient not taking: Reported on 2/19/2025), Disp: 27 capsule, Rfl: 1    Current Allergies     Allergies as of 02/19/2025 - Reviewed 02/19/2025   Allergen Reaction Noted    Cetirizine GI Intolerance 05/17/2021    Clarithromycin  11/26/2013    Iodinated contrast media  03/22/2019    Morphine  11/26/2013    Other Hives 11/26/2013            The following portions of the patient's history were reviewed and updated as appropriate: allergies, current medications, past family history, past medical history, past social history, past surgical history and problem list.     Past Medical History:   Diagnosis Date    Cancer (HCC) 2005    Colon CA stage 4    Colon cancer (HCC)     Disease of thyroid gland     Glaucoma     Hypothyroidism        Past Surgical History:   Procedure Laterality Date    LIVER SURGERY      RIGHT COLON RESECTION      chemo    SINUS SURGERY         Family History    Problem Relation Age of Onset    Heart disease Mother     Heart attack Mother     Hypertension Mother     Hyperlipidemia Mother     Diabetes Mother     Cancer Father         bladder    Hypertension Father     Hyperlipidemia Father     Heart attack Father     Heart disease Father     Diabetes Father     Prostate cancer Father     Heart disease Maternal Grandmother          Medications have been verified.        Objective   /63   Pulse 62   Temp 97.9 °F (36.6 °C)   Resp 20   LMP  (LMP Unknown) Comment: 06/2023  SpO2 99%   No LMP recorded (lmp unknown). Patient is postmenopausal.       Physical Exam     Physical Exam  Vitals and nursing note reviewed.   Constitutional:       General: She is not in acute distress.     Appearance: She is well-developed and normal weight. She is not ill-appearing, toxic-appearing or diaphoretic.   HENT:      Head: Normocephalic and atraumatic.      Right Ear: A middle ear effusion is present. Tympanic membrane is not erythematous.      Left Ear: Tympanic membrane and ear canal normal.      Ears:      Comments: Cerumen in canal - pt puts ear bud back in her right ear after evaluation      Nose: Congestion and rhinorrhea present.      Mouth/Throat:      Mouth: Mucous membranes are moist. No oral lesions.      Pharynx: Posterior oropharyngeal erythema present. No pharyngeal swelling, oropharyngeal exudate or uvula swelling.      Tonsils: No tonsillar exudate or tonsillar abscesses.   Eyes:      General: Lids are normal. Lids are everted, no foreign bodies appreciated. Vision grossly intact. Gaze aligned appropriately. No visual field deficit.        Right eye: No foreign body, discharge or hordeolum.      Extraocular Movements:      Right eye: Normal extraocular motion and no nystagmus.      Left eye: Normal extraocular motion and no nystagmus.      Conjunctiva/sclera:      Right eye: Right conjunctiva is not injected. No chemosis, exudate or hemorrhage.     Left eye: Left  conjunctiva is not injected. No chemosis, exudate or hemorrhage.  Cardiovascular:      Rate and Rhythm: Normal rate and regular rhythm.      Heart sounds: Normal heart sounds. No murmur heard.  Pulmonary:      [place]: Pulmonary effort is normal. No respiratory distress.      Breath sounds: Normal breath sounds. No stridor. No wheezing, rhonchi or rales.      Comments: No cough during exam   Chest:      Chest wall: No tenderness.   Musculoskeletal:      Cervical back: Normal range of motion and neck supple.   Lymphadenopathy:      Cervical: Cervical adenopathy present.   Skin:     General: Skin is warm and dry.      Capillary Refill: Capillary refill takes less than 2 seconds.   Neurological:      General: No focal deficit present.      Mental Status: She is alert and oriented to person, place, and time.   Psychiatric:         Mood and Affect: Mood normal.         Behavior: Behavior normal.       Pt had mentioned having CXR done.  She denies fevers, chills.  Lungs/assessment is benign.  Discussed un necessary radiation at this time and having CT scan in 1 week (chest) for cancer.   Pt agrees with POC.

## 2025-02-20 ENCOUNTER — TELEPHONE (OUTPATIENT)
Age: 55
End: 2025-02-20

## 2025-02-20 DIAGNOSIS — Z86.0100 HISTORY OF COLON POLYPS: Primary | ICD-10-CM

## 2025-02-20 RX ORDER — BISACODYL 5 MG/1
20 TABLET, DELAYED RELEASE ORAL ONCE
Qty: 4 TABLET | Refills: 0 | Status: SHIPPED | OUTPATIENT
Start: 2025-02-20 | End: 2025-02-20

## 2025-02-20 RX ORDER — POLYETHYLENE GLYCOL 3350 17 G/17G
238 POWDER, FOR SOLUTION ORAL ONCE
Qty: 238 G | Refills: 0 | Status: SHIPPED | OUTPATIENT
Start: 2025-02-20 | End: 2025-02-20

## 2025-02-20 NOTE — TELEPHONE ENCOUNTER
Patients GI provider:  Dr. Pereira    Number to return call: [phone]    Reason for call: Pt calling to ask when her procedure is scheduled, provided date. She questions why she needs a prescription prep and she did BRANDY/DUL last time.  Also RX did not go to pharmacy.  Please return her call to discuss/advise    Scheduled procedure/appointment date if applicable: 3/26/25

## 2025-02-25 ENCOUNTER — VBI (OUTPATIENT)
Dept: ADMINISTRATIVE | Facility: OTHER | Age: 55
End: 2025-02-25

## 2025-02-25 NOTE — TELEPHONE ENCOUNTER
02/25/25 10:16 AM     Chart reviewed for Mammogram was/were not submitted to the patient's insurance.     Laney Medina MA   [facility]

## 2025-03-04 ENCOUNTER — PATIENT MESSAGE (OUTPATIENT)
Dept: GASTROENTEROLOGY | Facility: CLINIC | Age: 55
End: 2025-03-04

## 2025-03-07 ENCOUNTER — APPOINTMENT (OUTPATIENT)
Dept: LAB | Facility: CLINIC | Age: 55
End: 2025-03-07
Payer: COMMERCIAL

## 2025-03-07 DIAGNOSIS — R73.03 PREDIABETES: Chronic | ICD-10-CM

## 2025-03-07 DIAGNOSIS — E03.8 OTHER SPECIFIED HYPOTHYROIDISM: ICD-10-CM

## 2025-03-07 DIAGNOSIS — E55.9 VITAMIN D DEFICIENCY: Chronic | ICD-10-CM

## 2025-03-07 DIAGNOSIS — E78.2 MIXED HYPERLIPIDEMIA: Chronic | ICD-10-CM

## 2025-03-07 DIAGNOSIS — R91.8 MASS OF UPPER LOBE OF RIGHT LUNG: ICD-10-CM

## 2025-03-07 LAB
25(OH)D3 SERPL-MCNC: 46.6 NG/ML (ref 30–100)
ALBUMIN SERPL BCG-MCNC: 4.5 G/DL (ref 3.5–5)
ALP SERPL-CCNC: 71 U/L (ref 34–104)
ALT SERPL W P-5'-P-CCNC: 11 U/L (ref 7–52)
ANION GAP SERPL CALCULATED.3IONS-SCNC: 9 MMOL/L (ref 4–13)
AST SERPL W P-5'-P-CCNC: 12 U/L (ref 13–39)
BASOPHILS # BLD AUTO: 0.06 THOUSANDS/ÂΜL (ref 0–0.1)
BASOPHILS NFR BLD AUTO: 1 % (ref 0–1)
BILIRUB SERPL-MCNC: 0.49 MG/DL (ref 0.2–1)
BUN SERPL-MCNC: 20 MG/DL (ref 5–25)
CALCIUM SERPL-MCNC: 10 MG/DL (ref 8.4–10.2)
CHLORIDE SERPL-SCNC: 98 MMOL/L (ref 96–108)
CHOLEST SERPL-MCNC: 240 MG/DL (ref ?–200)
CO2 SERPL-SCNC: 30 MMOL/L (ref 21–32)
CREAT SERPL-MCNC: 0.79 MG/DL (ref 0.6–1.3)
EOSINOPHIL # BLD AUTO: 0.26 THOUSAND/ÂΜL (ref 0–0.61)
EOSINOPHIL NFR BLD AUTO: 3 % (ref 0–6)
ERYTHROCYTE [DISTWIDTH] IN BLOOD BY AUTOMATED COUNT: 13.1 % (ref 11.6–15.1)
EST. AVERAGE GLUCOSE BLD GHB EST-MCNC: 123 MG/DL
GFR SERPL CREATININE-BSD FRML MDRD: 85 ML/MIN/1.73SQ M
GLUCOSE P FAST SERPL-MCNC: 113 MG/DL (ref 65–99)
HBA1C MFR BLD: 5.9 %
HCT VFR BLD AUTO: 42 % (ref 34.8–46.1)
HDLC SERPL-MCNC: 63 MG/DL
HGB BLD-MCNC: 13.6 G/DL (ref 11.5–15.4)
IMM GRANULOCYTES # BLD AUTO: 0.1 THOUSAND/UL (ref 0–0.2)
IMM GRANULOCYTES NFR BLD AUTO: 1 % (ref 0–2)
LDLC SERPL CALC-MCNC: 133 MG/DL (ref 0–100)
LYMPHOCYTES # BLD AUTO: 2.38 THOUSANDS/ÂΜL (ref 0.6–4.47)
LYMPHOCYTES NFR BLD AUTO: 26 % (ref 14–44)
MCH RBC QN AUTO: 32.6 PG (ref 26.8–34.3)
MCHC RBC AUTO-ENTMCNC: 32.4 G/DL (ref 31.4–37.4)
MCV RBC AUTO: 101 FL (ref 82–98)
MONOCYTES # BLD AUTO: 0.64 THOUSAND/ÂΜL (ref 0.17–1.22)
MONOCYTES NFR BLD AUTO: 7 % (ref 4–12)
NEUTROPHILS # BLD AUTO: 5.74 THOUSANDS/ÂΜL (ref 1.85–7.62)
NEUTS SEG NFR BLD AUTO: 62 % (ref 43–75)
NRBC BLD AUTO-RTO: 0 /100 WBCS
PLATELET # BLD AUTO: 330 THOUSANDS/UL (ref 149–390)
PMV BLD AUTO: 9.9 FL (ref 8.9–12.7)
POTASSIUM SERPL-SCNC: 4.6 MMOL/L (ref 3.5–5.3)
PROT SERPL-MCNC: 7.3 G/DL (ref 6.4–8.4)
RBC # BLD AUTO: 4.17 MILLION/UL (ref 3.81–5.12)
SODIUM SERPL-SCNC: 137 MMOL/L (ref 135–147)
T4 FREE SERPL-MCNC: 0.74 NG/DL (ref 0.61–1.12)
TRIGL SERPL-MCNC: 221 MG/DL (ref ?–150)
TSH SERPL DL<=0.05 MIU/L-ACNC: 6.59 UIU/ML (ref 0.45–4.5)
WBC # BLD AUTO: 9.18 THOUSAND/UL (ref 4.31–10.16)

## 2025-03-07 PROCEDURE — 84439 ASSAY OF FREE THYROXINE: CPT

## 2025-03-07 PROCEDURE — 80053 COMPREHEN METABOLIC PANEL: CPT

## 2025-03-07 PROCEDURE — 82306 VITAMIN D 25 HYDROXY: CPT

## 2025-03-07 PROCEDURE — 36415 COLL VENOUS BLD VENIPUNCTURE: CPT

## 2025-03-07 PROCEDURE — 85025 COMPLETE CBC W/AUTO DIFF WBC: CPT

## 2025-03-07 PROCEDURE — 83036 HEMOGLOBIN GLYCOSYLATED A1C: CPT

## 2025-03-07 PROCEDURE — 80061 LIPID PANEL: CPT

## 2025-03-07 PROCEDURE — 84443 ASSAY THYROID STIM HORMONE: CPT

## 2025-03-10 ENCOUNTER — OFFICE VISIT (OUTPATIENT)
Dept: FAMILY MEDICINE CLINIC | Facility: CLINIC | Age: 55
End: 2025-03-10
Payer: COMMERCIAL

## 2025-03-10 VITALS
TEMPERATURE: 97.1 F | OXYGEN SATURATION: 97 % | HEIGHT: 64 IN | WEIGHT: 147 LBS | SYSTOLIC BLOOD PRESSURE: 124 MMHG | BODY MASS INDEX: 25.1 KG/M2 | RESPIRATION RATE: 16 BRPM | DIASTOLIC BLOOD PRESSURE: 68 MMHG | HEART RATE: 71 BPM

## 2025-03-10 DIAGNOSIS — Z00.00 ANNUAL PHYSICAL EXAM: Primary | Chronic | ICD-10-CM

## 2025-03-10 DIAGNOSIS — E03.9 ACQUIRED HYPOTHYROIDISM: ICD-10-CM

## 2025-03-10 DIAGNOSIS — R73.03 PREDIABETES: Chronic | ICD-10-CM

## 2025-03-10 DIAGNOSIS — R73.01 IMPAIRED FASTING GLUCOSE: ICD-10-CM

## 2025-03-10 DIAGNOSIS — H40.1134 PRIMARY OPEN ANGLE GLAUCOMA (POAG) OF BOTH EYES, INDETERMINATE STAGE: Chronic | ICD-10-CM

## 2025-03-10 DIAGNOSIS — Z79.899 ON LONG TERM DRUG THERAPY: ICD-10-CM

## 2025-03-10 DIAGNOSIS — L05.91 PILONIDAL CYST: Chronic | ICD-10-CM

## 2025-03-10 DIAGNOSIS — R91.8 MASS OF UPPER LOBE OF RIGHT LUNG: Chronic | ICD-10-CM

## 2025-03-10 DIAGNOSIS — E78.00 PURE HYPERCHOLESTEROLEMIA: ICD-10-CM

## 2025-03-10 DIAGNOSIS — Z12.31 ENCOUNTER FOR SCREENING MAMMOGRAM FOR BREAST CANCER: ICD-10-CM

## 2025-03-10 DIAGNOSIS — Z85.038 HISTORY OF COLON CANCER: Chronic | ICD-10-CM

## 2025-03-10 DIAGNOSIS — R94.6 ABNORMAL RESULTS OF THYROID FUNCTION STUDIES: ICD-10-CM

## 2025-03-10 DIAGNOSIS — E55.9 VITAMIN D DEFICIENCY: Chronic | ICD-10-CM

## 2025-03-10 DIAGNOSIS — R09.81 SINUS CONGESTION: Chronic | ICD-10-CM

## 2025-03-10 PROBLEM — C18.9 MALIGNANT NEOPLASM OF COLON (HCC): Chronic | Status: ACTIVE | Noted: 2025-03-01

## 2025-03-10 PROBLEM — C18.9 MALIGNANT NEOPLASM OF COLON (HCC): Status: ACTIVE | Noted: 2025-03-01

## 2025-03-10 PROCEDURE — 99396 PREV VISIT EST AGE 40-64: CPT | Performed by: NURSE PRACTITIONER

## 2025-03-10 NOTE — ASSESSMENT & PLAN NOTE
Component  Ref Range & Units (hover) 3/7/25  7:18 AM 10/11/24  7:32 AM 5/3/24  7:22 AM 6/20/23 12:00 AM 12/12/22 12:00 AM 6/10/22 12:00 AM   Hemoglobin A1C 5.9 High  5.9 High  5.9 High  5.8 High  R, CM 6.0 High  R, CM 5.7 High  R, CM    123 123 120 R 126 R 117       This is a chronic disease process.   The patient is stable at this time.  We discussed diet and exercise.  Will monitor labs.  Orders:  •  Hemoglobin A1C; Future

## 2025-03-10 NOTE — ASSESSMENT & PLAN NOTE
Component  Ref Range & Units (hover) 3/7/25  7:18 AM 10/11/24  7:32 AM 3/1/24  7:41 AM   Cholesterol 240 High  212 High   CM   Comment: Cholesterol:        Pediatric <18 Years        Desirable          <170 mg/dL      Borderline High    170-199 mg/dL      High               >=200 mg/dL        Adult >=18 Years           Desirable         <200 mg/dL      Borderline High   200-239 mg/dL      High             >239 mg/dL   Triglycerides 221 High  138  CM   Comment: Triglyceride:     0-9Y            <75mg/dL     10Y-17Y         <90 mg/dL       >=18Y     Normal          <150 mg/dL     Borderline High 150-199 mg/dL     High            200-499 mg/dL       Very High       >499 mg/dL    Specimen collection should occur prior to Metamizole administration due to the potential for falsely depressed results.   HDL, Direct 63 55 57   LDL Calculated 133 High  129 High  CM 88 CM   Comment: LDL Cholesterol:    Optimal           <100 mg/dl    Near Optimal      100-129 mg/dl    Above Optimal      Borderline High 130-159 mg/dl      High            160-189 mg/dl      Very High       >189 mg/dl            This is a chronic disease process.   The patient is unstable at this time.  We discussed diet and exercise.  Will monitor labs.  Continue  Zetia  Patient was on Crestor in the past no longer on this ???  Will recheck lipid panel in 3 months-6/10/2025  Orders:  •  Lipid Panel with Direct LDL reflex; Future  •  Lipid Panel with Direct LDL reflex; Future

## 2025-03-10 NOTE — ASSESSMENT & PLAN NOTE
Pt had CSY in 9/2024 which showed adenomatous polyp which was removed piecemeal. Margin status unknown. Scheduled for repeat scope in March.   Denies any GI complains. At baseline health.

## 2025-03-10 NOTE — PATIENT INSTRUCTIONS
"Please repeat your thyroid blood work on 4/18/2025      Please repeat your lipid panel in 3 months-6/10/2025    _________________________________________________________________  YOU ARE DUE FOR YOUR ANNUAL PHYSICAL EXAM AFTER 3/10/2026.  REPEAT LABS ORDERED, PLEASE HAVE THEM DRAWN THE WEEK PRIOR TO YOUR VISIT (APPROXIMATELY 3/3/2026).  LABS HAVE BEEN ORDERED FOR YOU.   THESE LABS SHOULD BE DRAWN PRIOR TO YOUR NEXT VISIT.  YOU CAN HAVE THEM DRAWN UP TO 1 WEEK PRIOR TO YOUR NEXT VISIT.  HAVING YOUR BLOOD WORK WHEN YOU COME TO YOUR NEXT VISIT WILL ALLOW ME TO PROVIDE THE BEST MEDICAL CARE FOR YOU WITHOUT HAVING EITHER OF US PERFORM MORE WORK THAN NECESSARY.  PLEASE BE COMPLIANT WITH THIS REQUEST.   _____________________________________________________________________  Patient Education     Routine physical for adults   The Basics   Written by the doctors and editors at [facility]   What is a physical? -- A physical is a routine visit, or \"check-up,\" with your doctor. You might also hear it called a \"wellness visit\" or \"preventive visit.\"  During each visit, the doctor will:   Ask about your physical and mental health   Ask about your habits, behaviors, and lifestyle   Do an exam   Give you vaccines if needed   Talk to you about any medicines you take   Give advice about your health   Answer your questions  Getting regular check-ups is an important part of taking care of your health. It can help your doctor find and treat any problems you have. But it's also important for preventing health problems.  A routine physical is different from a \"sick visit.\" A sick visit is when you see a doctor because of a health concern or problem. Since physicals are scheduled ahead of time, you can think about what you want to ask the doctor.  How often should I get a physical? -- It depends on your age and health. In general, for people age 21 years and older:   If you are younger than 50 years, you might be able to get a physical every " "3 years.   If you are 50 years or older, your doctor might recommend a physical every year.  If you have an ongoing health condition, like diabetes or high blood pressure, your doctor will probably want to see you more often.  What happens during a physical? -- In general, each visit will include:   Physical exam - The doctor or nurse will check your height, weight, heart rate, and blood pressure. They will also look at your eyes and ears. They will ask about how you are feeling and whether you have any symptoms that bother you.   Medicines - It's a good idea to bring a list of all the medicines you take to each doctor visit. Your doctor will talk to you about your medicines and answer any questions. Tell them if you are having any side effects that bother you. You should also tell them if you are having trouble paying for any of your medicines.   Habits and behaviors - This includes:   Your diet   Your exercise habits   Whether you smoke, drink alcohol, or use drugs   Whether you are sexually active   Whether you feel safe at home  Your doctor will talk to you about things you can do to improve your health and lower your risk of health problems. They will also offer help and support. For example, if you want to quit smoking, they can give you advice and might prescribe medicines. If you want to improve your diet or get more physical activity, they can help you with this, too.   Lab tests, if needed - The tests you get will depend on your age and situation. For example, your doctor might want to check your:   Cholesterol   Blood sugar   Iron level   Vaccines - The recommended vaccines will depend on your age, health, and what vaccines you already had. Vaccines are very important because they can prevent certain serious or deadly infections.   Discussion of screening - \"Screening\" means checking for diseases or other health problems before they cause symptoms. Your doctor can recommend screening based on your age, " risk, and preferences. This might include tests to check for:   Cancer, such as breast, prostate, cervical, ovarian, colorectal, prostate, lung, or skin cancer   Sexually transmitted infections, such as chlamydia and gonorrhea   Mental health conditions like depression and anxiety  Your doctor will talk to you about the different types of screening tests. They can help you decide which screenings to have. They can also explain what the results might mean.   Answering questions - The physical is a good time to ask the doctor or nurse questions about your health. If needed, they can refer you to other doctors or specialists, too.  Adults older than 65 years often need other care, too. As you get older, your doctor will talk to you about:   How to prevent falling at home   Hearing or vision tests   Memory testing   How to take your medicines safely   Making sure that you have the help and support you need at home  All topics are updated as new evidence becomes available and our peer review process is complete.  This topic retrieved from [employer] on: May 02, 2024.  Topic 288540 Version 1.0  Release: 32.4.3 - C32.122  © 2024 UpToDate, Inc. and/or its affiliates. All rights reserved.  Consumer Information Use and Disclaimer   Disclaimer: This generalized information is a limited summary of diagnosis, treatment, and/or medication information. It is not meant to be comprehensive and should be used as a tool to help the user understand and/or assess potential diagnostic and treatment options. It does NOT include all information about conditions, treatments, medications, side effects, or risks that may apply to a specific patient. It is not intended to be medical advice or a substitute for the medical advice, diagnosis, or treatment of a health care provider based on the health care provider's examination and assessment of a patient's specific and unique circumstances. Patients must speak with a health care provider for  complete information about their health, medical questions, and treatment options, including any risks or benefits regarding use of medications. This information does not endorse any treatments or medications as safe, effective, or approved for treating a specific patient. UpToDate, Inc. and its affiliates disclaim any warranty or liability relating to this information or the use thereof.The use of this information is governed by the Terms of Use, available at https://www.woltersAnimal Kingdomuwer.com/en/know/clinical-effectiveness-terms. 2024© UpToDate, Inc. and its affiliates and/or licensors. All rights reserved.  Copyright   © 2024 UpToDate, Inc. and/or its affiliates. All rights reserved.

## 2025-03-10 NOTE — ASSESSMENT & PLAN NOTE
Component  Ref Range & Units (hover) 3/7/25  7:18 AM 10/11/24  7:32 AM 7/2/24  8:31 AM 5/3/24  7:22 AM 3/1/24  7:41 AM 2/24/23  6:41 PM   TSH 3RD GENERATON 6.592 High  1.773 CM 0.762 CM 1.465 CM 11.180 High  CM 3.826            Component  Ref Range & Units (hover) 3/7/25  7:18 AM 10/11/24  7:32 AM 5/3/24  7:22 AM 3/1/24  7:41 AM   Free T4 0.74 0.65 CM 0.68 CM 0.43 Low         This is a chronic disease process.   The patient is stable at this time.  Will monitor labs.  Continue  Levothyroxine  Will recheck labs on 4/18/2025  Orders:  •  TSH, 3rd generation with Free T4 reflex; Future  •  TSH, 3rd generation with Free T4 reflex; Future

## 2025-03-10 NOTE — ASSESSMENT & PLAN NOTE
Component  Ref Range & Units (hover) 3/7/25  7:18 AM 10/11/24  7:32 AM 7/2/24  8:31 AM 4/9/24 10:12 AM   Vit D, 25-Hydroxy 46.6 34.9 CM 51.4 CM 25.1 Low      Continue 50,000 unit white tablets     Orders:  •  Vitamin D 25 hydroxy; Future

## 2025-03-10 NOTE — PROGRESS NOTES
Adult Annual Physical  Name: Deb Flowers      : 1970      MRN: [id number]  Encounter Provider: ADAIR Reis  Encounter Date: 3/10/2025   Encounter department: [facility]    Assessment & Plan  Pilonidal cyst         History of colon cancer  Pt had CSY in 2024 which showed adenomatous polyp which was removed piecemeal. Margin status unknown. Scheduled for repeat scope in March.   Denies any GI complains. At baseline health.        Prediabetes  Component  Ref Range & Units (hover) 3/7/25  7:18 AM 10/11/24  7:32 AM 5/3/24  7:22 AM 23 12:00 AM 22 12:00 AM 6/10/22 12:00 AM   Hemoglobin A1C 5.9 High  5.9 High  5.9 High  5.8 High  R, CM 6.0 High  R, CM 5.7 High  R, CM    123 123 120 R 126 R 117       This is a chronic disease process.   The patient is stable at this time.  We discussed diet and exercise.  Will monitor labs.  Orders:  •  Hemoglobin A1C; Future    Pure hypercholesterolemia        Component  Ref Range & Units (hover) 3/7/25  7:18 AM 10/11/24  7:32 AM 3/1/24  7:41 AM   Cholesterol 240 High  212 High   CM   Comment: Cholesterol:        Pediatric <18 Years        Desirable          <170 mg/dL      Borderline High    170-199 mg/dL      High               >=200 mg/dL        Adult >=18 Years           Desirable         <200 mg/dL      Borderline High   200-239 mg/dL      High             >239 mg/dL   Triglycerides 221 High  138  CM   Comment: Triglyceride:     0-9Y            <75mg/dL     10Y-17Y         <90 mg/dL       >=18Y     Normal          <150 mg/dL     Borderline High 150-199 mg/dL     High            200-499 mg/dL       Very High       >499 mg/dL    Specimen collection should occur prior to Metamizole administration due to the potential for falsely depressed results.   HDL, Direct 63 55 57   LDL Calculated 133 High  129 High  CM 88 CM   Comment: LDL Cholesterol:    Optimal           <100 mg/dl    Near Optimal      100-129 mg/dl     Above Optimal      Borderline High 130-159 mg/dl      High            160-189 mg/dl      Very High       >189 mg/dl            This is a chronic disease process.   The patient is unstable at this time.  We discussed diet and exercise.  Will monitor labs.  Continue  Zetia  Patient was on Crestor in the past no longer on this ???  Will recheck lipid panel in 3 months-6/10/2025  Orders:  •  Lipid Panel with Direct LDL reflex; Future  •  Lipid Panel with Direct LDL reflex; Future    Acquired hypothyroidism  Component  Ref Range & Units (hover) 3/7/25  7:18 AM 10/11/24  7:32 AM 7/2/24  8:31 AM 5/3/24  7:22 AM 3/1/24  7:41 AM 2/24/23  6:41 PM   TSH 3RD GENERATON 6.592 High  1.773 CM 0.762 CM 1.465 CM 11.180 High  CM 3.826            Component  Ref Range & Units (hover) 3/7/25  7:18 AM 10/11/24  7:32 AM 5/3/24  7:22 AM 3/1/24  7:41 AM   Free T4 0.74 0.65 CM 0.68 CM 0.43 Low         This is a chronic disease process.   The patient is stable at this time.  Will monitor labs.  Continue  Levothyroxine  Will recheck labs on 4/18/2025  Orders:  •  TSH, 3rd generation with Free T4 reflex; Future  •  TSH, 3rd generation with Free T4 reflex; Future    Vitamin D deficiency         Component  Ref Range & Units (hover) 3/7/25  7:18 AM 10/11/24  7:32 AM 7/2/24  8:31 AM 4/9/24 10:12 AM   Vit D, 25-Hydroxy 46.6 34.9 CM 51.4 CM 25.1 Low      Continue 50,000 unit white tablets     Orders:  •  Vitamin D 25 hydroxy; Future    Encounter for screening mammogram for breast cancer    Orders:  •  Mammo screening bilateral w 3d and cad; Future    Mass of upper lobe of right lung         Annual physical exam    Orders:  •  CBC and differential; Future  •  Comprehensive metabolic panel; Future    Primary open angle glaucoma (POAG) of both eyes, indeterminate stage         On long term drug therapy    Orders:  •  TSH, 3rd generation with Free T4 reflex; Future  •  Hemoglobin A1C; Future  •  Lipid Panel with Direct LDL reflex; Future  •  TSH, 3rd  generation with Free T4 reflex; Future  •  Vitamin D 25 hydroxy; Future  •  Lipid Panel with Direct LDL reflex; Future    Abnormal results of thyroid function studies    Orders:  •  TSH, 3rd generation with Free T4 reflex; Future  •  TSH, 3rd generation with Free T4 reflex; Future    Impaired fasting glucose    Orders:  •  Hemoglobin A1C; Future    Sinus congestion           Immunizations and preventive care screenings were discussed with patient today. Appropriate education was printed on patient's after visit summary.    Counseling:  Alcohol/drug use: discussed moderation in alcohol intake, the recommendations for healthy alcohol use, and avoidance of illicit drug use.  Dental Health: discussed importance of regular tooth brushing, flossing, and dental visits.  Injury prevention: discussed safety/seat belts, safety helmets, smoke detectors, carbon monoxide detectors, and smoking near bedding or upholstery.  Sexual health: discussed sexually transmitted diseases, partner selection, use of condoms, avoidance of unintended pregnancy, and contraceptive alternatives.  Exercise: the importance of regular exercise/physical activity was discussed. Recommend exercise 3-5 times per week for at least 30 minutes.          History of Present Illness     Adult Annual Physical:  Patient presents for annual physical.     Diet and Physical Activity:  - Diet/Nutrition: well balanced diet.  - Exercise: walking.    Depression Screening:  - PHQ-2 Score: 0  - PHQ-9 Score: 0    General Health:  - Sleep: sleeps well.  - Hearing: normal hearing bilateral ears.  - Vision: no vision problems.  - Dental: regular dental visits.    /GYN Health:  - Follows with GYN: yes.   - History of STDs: no    Advanced Care Planning:  - Has an advanced directive?: no    - Has a durable medical POA?: no    - ACP document given to patient?: no      Review of Systems   Constitutional:  Negative for activity change, chills, fatigue and fever.   HENT:   Positive for congestion. Negative for rhinorrhea and sore throat.    Eyes:  Negative for pain.   Respiratory:  Negative for cough and shortness of breath.    Cardiovascular:  Negative for chest pain, palpitations and leg swelling.   Gastrointestinal:  Negative for abdominal pain, constipation, diarrhea, nausea and vomiting.   Genitourinary:  Negative for difficulty urinating, flank pain, frequency and urgency.   Musculoskeletal:  Negative for gait problem, joint swelling and myalgias.   Skin:  Negative for color change.   Neurological:  Negative for dizziness, weakness, light-headedness and headaches.   Psychiatric/Behavioral:  Negative for sleep disturbance. The patient is not nervous/anxious.    All other systems reviewed and are negative.    Current Outpatient Medications on File Prior to Visit   Medication Sig Dispense Refill   • BIMATOPROST OP Apply 1 drop to eye daily     • bisacodyl (DULCOLAX) 5 mg EC tablet Take 4 tablets (20 mg total) by mouth once for 1 dose Colonoscopy prep 4 tablet 0   • cyclobenzaprine (FLEXERIL) 5 mg tablet Take 1 tablet (5 mg total) by mouth 3 (three) times a day as needed for muscle spasms 30 tablet 0   • Ergocalciferol (VITAMIN D2 PO) Take 50,000 Units by mouth once a week     • ezetimibe (ZETIA) 10 mg tablet Take 1 tablet (10 mg total) by mouth daily 90 tablet 1   • latanoprost (XALATAN) 0.005 % ophthalmic solution Administer 1 drop to both eyes daily at bedtime     • levothyroxine (Euthyrox) 50 mcg tablet Take 1 tablet (50 mcg total) by mouth daily in the early morning 90 tablet 1   • Multiple Vitamins-Minerals (CENTRUM ADULTS PO) Take 1 tablet by mouth in the morning     • naproxen sodium (ALEVE) 220 MG tablet Take 220 mg by mouth every 12 (twelve) hours as needed     • NON FORMULARY DARCI botanical extract blend     • polyethylene glycol (MIRALAX) 17 g packet Take 238 g by mouth once for 1 dose For bowel prep. Mix in 64 oz of gatorade. Drink 32 oz at the rate of 8 oz/1 glass  "every 15 mins starting at 5 pm on the evening prior to day of procedure. Drink the remaining 32 oz 5 hours prior to procedure time at at the rate of 8 oz/1 glass every 15 mins until finished. 238 g 0   • predniSONE 10 mg tablet Take 5 tabs po x 2 days; 4 tabs po x 2 days; 3 tabs po x 1 day; 2 tabs po x 1 day. 1 tab po x 1 day. 24 tablet 0   • timolol (TIMOPTIC) 0.5 % ophthalmic solution Administer 1 drop to both eyes daily     • [DISCONTINUED] ergocalciferol (VITAMIN D2) 50,000 units Take 1 capsule (50,000 Units total) by mouth once a week (Patient taking differently: Take 50,000 Units by mouth once a week Pt bough Vit D 50,000 from [employer] that does not have blue dye.) 27 capsule 1   • [DISCONTINUED] NAPROXEN PO Take by mouth       No current facility-administered medications on file prior to visit.        Objective   /68 (BP Location: Left arm, Patient Position: Sitting, Cuff Size: Standard)   Pulse 71   Temp (!) 97.1 °F (36.2 °C) (Tympanic)   Resp 16   Ht 5' 4\" (1.626 m)   Wt 66.7 kg (147 lb)   LMP  (LMP Unknown) Comment: 06/2023  SpO2 97%   BMI 25.23 kg/m²     Physical Exam  Vitals and nursing note reviewed.   Constitutional:       General: She is awake. She is not in acute distress.     Appearance: Normal appearance. She is well-developed.   HENT:      Head: Normocephalic and atraumatic.      Ears:      Comments: Right > Left cerumen - not impacted  Chronic sinus congestion      Nose: Nose normal.      Right Turbinates: Swollen.      Left Turbinates: Swollen.      Mouth/Throat:      Mouth: Mucous membranes are moist.   Eyes:      Conjunctiva/sclera: Conjunctivae normal.   Cardiovascular:      Rate and Rhythm: Normal rate and regular rhythm.      Pulses: Normal pulses.      Heart sounds: Normal heart sounds. No murmur heard.  Pulmonary:      Effort: Pulmonary effort is normal. No respiratory distress.      Breath sounds: Normal breath sounds.   Abdominal:      General: Bowel sounds are normal.      " Palpations: Abdomen is soft.      Tenderness: There is no abdominal tenderness.   Musculoskeletal:      Cervical back: Neck supple.      Right lower leg: No edema.      Left lower leg: No edema.   Skin:     General: Skin is warm and dry.   Neurological:      Mental Status: She is alert and oriented to person, place, and time.   Psychiatric:         Attention and Perception: Attention normal.         Mood and Affect: Mood normal.         Speech: Speech normal.         Behavior: Behavior normal. Behavior is cooperative.         Thought Content: Thought content normal.         Cognition and Memory: Cognition normal.         Judgment: Judgment normal.       Administrative Statements   I have spent a total time of 45 minutes in caring for this patient on the day of the visit/encounter including Diagnostic results, Prognosis, Risks and benefits of tx options, Instructions for management, Patient and family education, Importance of tx compliance, Risk factor reductions, Impressions, Counseling / Coordination of care, Documenting in the medical record, Reviewing/placing orders in the medical record (including tests, medications, and/or procedures), and Obtaining or reviewing history  .

## 2025-03-10 NOTE — ASSESSMENT & PLAN NOTE
Orders:  •  TSH, 3rd generation with Free T4 reflex; Future  •  Hemoglobin A1C; Future  •  Lipid Panel with Direct LDL reflex; Future  •  TSH, 3rd generation with Free T4 reflex; Future  •  Vitamin D 25 hydroxy; Future  •  Lipid Panel with Direct LDL reflex; Future

## 2025-03-12 ENCOUNTER — ANESTHESIA EVENT (OUTPATIENT)
Dept: ANESTHESIOLOGY | Facility: HOSPITAL | Age: 55
End: 2025-03-12

## 2025-03-12 ENCOUNTER — ANESTHESIA (OUTPATIENT)
Dept: ANESTHESIOLOGY | Facility: HOSPITAL | Age: 55
End: 2025-03-12

## 2025-03-16 ENCOUNTER — PATIENT MESSAGE (OUTPATIENT)
Dept: FAMILY MEDICINE CLINIC | Facility: CLINIC | Age: 55
End: 2025-03-16

## 2025-03-17 ENCOUNTER — OFFICE VISIT (OUTPATIENT)
Dept: FAMILY MEDICINE CLINIC | Facility: CLINIC | Age: 55
End: 2025-03-17
Payer: COMMERCIAL

## 2025-03-17 DIAGNOSIS — R39.9 UTI SYMPTOMS: Chronic | ICD-10-CM

## 2025-03-17 DIAGNOSIS — N30.01 ACUTE CYSTITIS WITH HEMATURIA: Primary | Chronic | ICD-10-CM

## 2025-03-17 LAB
SL AMB  POCT GLUCOSE, UA: NEGATIVE
SL AMB LEUKOCYTE ESTERASE,UA: ABNORMAL
SL AMB POCT BILIRUBIN,UA: NEGATIVE
SL AMB POCT BLOOD,UA: ABNORMAL
SL AMB POCT CLARITY,UA: ABNORMAL
SL AMB POCT COLOR,UA: YELLOW
SL AMB POCT KETONES,UA: NEGATIVE
SL AMB POCT NITRITE,UA: POSITIVE
SL AMB POCT PH,UA: 6
SL AMB POCT SPECIFIC GRAVITY,UA: 1.02
SL AMB POCT URINE PROTEIN: NEGATIVE
SL AMB POCT UROBILINOGEN: NEGATIVE

## 2025-03-17 PROCEDURE — 87077 CULTURE AEROBIC IDENTIFY: CPT | Performed by: NURSE PRACTITIONER

## 2025-03-17 PROCEDURE — 81002 URINALYSIS NONAUTO W/O SCOPE: CPT | Performed by: NURSE PRACTITIONER

## 2025-03-17 PROCEDURE — 87186 SC STD MICRODIL/AGAR DIL: CPT | Performed by: NURSE PRACTITIONER

## 2025-03-17 PROCEDURE — 87086 URINE CULTURE/COLONY COUNT: CPT | Performed by: NURSE PRACTITIONER

## 2025-03-17 RX ORDER — CIPROFLOXACIN 500 MG/1
500 TABLET, FILM COATED ORAL EVERY 12 HOURS SCHEDULED
Qty: 20 TABLET | Refills: 0 | Status: SHIPPED | OUTPATIENT
Start: 2025-03-17 | End: 2025-03-27

## 2025-03-17 RX ORDER — FLUCONAZOLE 100 MG/1
100 TABLET ORAL EVERY OTHER DAY
Qty: 5 TABLET | Refills: 0 | Status: SHIPPED | OUTPATIENT
Start: 2025-03-17 | End: 2025-03-26

## 2025-03-17 NOTE — ASSESSMENT & PLAN NOTE
Orders:  •  ciprofloxacin (Cipro) 500 mg tablet; Take 1 tablet (500 mg total) by mouth every 12 (twelve) hours for 10 days  •  fluconazole (DIFLUCAN) 100 mg tablet; Take 1 tablet (100 mg total) by mouth every other day for 5 doses

## 2025-03-17 NOTE — PROGRESS NOTES
Name: Deb Flowers      : 1970      MRN: [id number]  Encounter Provider: ADAIR Reis  Encounter Date: 3/17/2025   Encounter department: [facility]  :  Assessment & Plan  UTI symptoms    Orders:  •  POCT urine dip  •  Urine culture; Future    Acute cystitis with hematuria    Orders:  •  ciprofloxacin (Cipro) 500 mg tablet; Take 1 tablet (500 mg total) by mouth every 12 (twelve) hours for 10 days  •  fluconazole (DIFLUCAN) 100 mg tablet; Take 1 tablet (100 mg total) by mouth every other day for 5 doses           History of Present Illness   The patient is here today to discuss her UTI, she has a positive POCT urinalysis.   I reviewed their medical conditions, medications, laboratory and radiology studies.  I reviewed their scheduled future lab studies with the patient.   The patient's current treatment plan is effective.   There is no change in the current therapy.   I ask the patient to continue their current therapy.   The patient voiced their understanding and agreement.   Follow up as scheduled .  Please continue to the PORCH section of the note for details of today's visit.             Review of Systems   Genitourinary:  Positive for dysuria, frequency and urgency.       Objective   LMP  (LMP Unknown) Comment: 2023     Physical Exam  Genitourinary:     Comments: Dysuria, frequency, urgency       Administrative Statements   I have spent a total time of 20 minutes in caring for this patient on the day of the visit/encounter including Diagnostic results, Prognosis, Risks and benefits of tx options, Instructions for management, Patient and family education, Importance of tx compliance, Risk factor reductions, Impressions, Counseling / Coordination of care, Documenting in the medical record, Reviewing/placing orders in the medical record (including tests, medications, and/or procedures), and Obtaining or reviewing history  .  Assessment:      Acute cystitis and UTI       Plan:  Plan:      1. Medications: ciprofloxacin and diflucan   2. Maintain adequate hydration  3. Follow up if symptoms not improving, and prn.     Subjective:      Deb Flowers is a 54 y.o. female who complains of burning with urination, dysuria, frequency, and urgency for 3 days.  Patient also complains of  dysuria  . Patient denies back pain, fever, stomach ache, and vaginal discharge.  Patient does not have a history of recurrent UTI.  Patient does not have a history of pyelonephritis.  The following portions of the patient's history were reviewed and updated as appropriate: allergies, current medications, past family history, past medical history, past social history, past surgical history, and problem list.    Review of Systems

## 2025-03-19 ENCOUNTER — TELEPHONE (OUTPATIENT)
Age: 55
End: 2025-03-19

## 2025-03-19 NOTE — TELEPHONE ENCOUNTER
Patient contacted office questioning if she can continue with procedure although she just start on antibiotics for UTI. Made aware as per nursing team, okay to continue with procedure.

## 2025-03-20 LAB — BACTERIA UR CULT: ABNORMAL

## 2025-03-26 ENCOUNTER — ANESTHESIA (OUTPATIENT)
Dept: GASTROENTEROLOGY | Facility: MEDICAL CENTER | Age: 55
End: 2025-03-26
Payer: COMMERCIAL

## 2025-03-26 ENCOUNTER — HOSPITAL ENCOUNTER (OUTPATIENT)
Dept: GASTROENTEROLOGY | Facility: MEDICAL CENTER | Age: 55
Setting detail: OUTPATIENT SURGERY
Discharge: HOME | End: 2025-03-26
Payer: COMMERCIAL

## 2025-03-26 ENCOUNTER — ANESTHESIA EVENT (OUTPATIENT)
Dept: GASTROENTEROLOGY | Facility: MEDICAL CENTER | Age: 55
End: 2025-03-26
Payer: COMMERCIAL

## 2025-03-26 VITALS
BODY MASS INDEX: 25.1 KG/M2 | DIASTOLIC BLOOD PRESSURE: 79 MMHG | TEMPERATURE: 97.2 F | HEIGHT: 64 IN | RESPIRATION RATE: 20 BRPM | WEIGHT: 147 LBS | HEART RATE: 79 BPM | SYSTOLIC BLOOD PRESSURE: 106 MMHG | OXYGEN SATURATION: 100 %

## 2025-03-26 DIAGNOSIS — Z86.0100 HISTORY OF COLON POLYPS: ICD-10-CM

## 2025-03-26 PROCEDURE — 45385 COLONOSCOPY W/LESION REMOVAL: CPT | Performed by: INTERNAL MEDICINE

## 2025-03-26 PROCEDURE — 88305 TISSUE EXAM BY PATHOLOGIST: CPT | Performed by: STUDENT IN AN ORGANIZED HEALTH CARE EDUCATION/TRAINING PROGRAM

## 2025-03-26 RX ORDER — SODIUM CHLORIDE, SODIUM LACTATE, POTASSIUM CHLORIDE, CALCIUM CHLORIDE 600; 310; 30; 20 MG/100ML; MG/100ML; MG/100ML; MG/100ML
INJECTION, SOLUTION INTRAVENOUS CONTINUOUS PRN
Status: DISCONTINUED | OUTPATIENT
Start: 2025-03-26 | End: 2025-03-26

## 2025-03-26 RX ORDER — PROPOFOL 10 MG/ML
INJECTION, EMULSION INTRAVENOUS AS NEEDED
Status: DISCONTINUED | OUTPATIENT
Start: 2025-03-26 | End: 2025-03-26

## 2025-03-26 RX ORDER — SODIUM CHLORIDE, SODIUM LACTATE, POTASSIUM CHLORIDE, CALCIUM CHLORIDE 600; 310; 30; 20 MG/100ML; MG/100ML; MG/100ML; MG/100ML
125 INJECTION, SOLUTION INTRAVENOUS CONTINUOUS
Status: CANCELLED | OUTPATIENT
Start: 2025-03-26

## 2025-03-26 RX ORDER — LIDOCAINE HYDROCHLORIDE 20 MG/ML
INJECTION, SOLUTION EPIDURAL; INFILTRATION; INTRACAUDAL; PERINEURAL AS NEEDED
Status: DISCONTINUED | OUTPATIENT
Start: 2025-03-26 | End: 2025-03-26

## 2025-03-26 RX ADMIN — PROPOFOL 30 MG: 10 INJECTION, EMULSION INTRAVENOUS at 13:28

## 2025-03-26 RX ADMIN — LIDOCAINE HYDROCHLORIDE 100 MG: 20 INJECTION, SOLUTION EPIDURAL; INFILTRATION; INTRACAUDAL at 13:06

## 2025-03-26 RX ADMIN — PROPOFOL 120 MCG/KG/MIN: 10 INJECTION, EMULSION INTRAVENOUS at 13:07

## 2025-03-26 RX ADMIN — SODIUM CHLORIDE, SODIUM LACTATE, POTASSIUM CHLORIDE, AND CALCIUM CHLORIDE: .6; .31; .03; .02 INJECTION, SOLUTION INTRAVENOUS at 13:04

## 2025-03-26 RX ADMIN — PROPOFOL 100 MG: 10 INJECTION, EMULSION INTRAVENOUS at 13:06

## 2025-03-26 NOTE — ANESTHESIA POSTPROCEDURE EVALUATION
Post-Op Assessment Note    CV Status:  Stable    Pain management: adequate       Mental Status:  Alert and awake   Hydration Status:  Euvolemic   PONV Controlled:  Controlled   Airway Patency:  Patent     Post Op Vitals Reviewed: Yes    No anethesia notable event occurred.    Staff: CRNA           Last Filed PACU Vitals:  Vitals Value Taken Time   Temp     Pulse 81 03/26/25 1333   /63 03/26/25 1333   Resp 20 03/26/25 1333   SpO2 100 % 03/26/25 1333

## 2025-03-26 NOTE — ANESTHESIA PREPROCEDURE EVALUATION
Procedure:  COLONOSCOPY    Relevant Problems   CARDIO   (+) Pure hypercholesterolemia      ENDO   (+) Acquired hypothyroidism      GI/HEPATIC   (+) Malignant neoplasm of colon (HCC)        Physical Exam    Airway  Comment: Small mouth  Mallampati score: III  TM Distance: >3 FB  Neck ROM: full     Dental        Cardiovascular  Rhythm: regular, Rate: normal, Cardiovascular exam normal    Pulmonary  Pulmonary exam normal Breath sounds clear to auscultation    Other Findings  post-pubertal.      Anesthesia Plan  ASA Score- 2     Anesthesia Type- IV sedation with anesthesia with ASA Monitors.         Additional Monitors:     Airway Plan:            Plan Factors-    Chart reviewed.    Patient summary reviewed.                  Induction-     Postoperative Plan-         Informed Consent- Anesthetic plan and risks discussed with patient.  I personally reviewed this patient with the CRNA. Discussed and agreed on the Anesthesia Plan with the CRNA..      NPO Status:  Vitals Value Taken Time   Date of last liquid 03/26/25 03/26/25 1241   Time of last liquid 0800 03/26/25 1241   Date of last solid 03/24/25 03/26/25 1241   Time of last solid 2000 03/26/25 1241

## 2025-03-26 NOTE — H&P
"History and Physical -  [facility]  Deb Flowers 54 y.o. female MRN: [id number]                  HPI: Deb Flowers is a 54 y.o. year old female who presents for colonoscopy due to history of colon polyps and history of colon cancer.      REVIEW OF SYSTEMS: Per the HPI, and otherwise unremarkable.    Historical Information   Past Medical History:   Diagnosis Date    Cancer (HCC) 2005    Colon CA stage 4    Colon cancer (HCC)     Disease of thyroid gland     Glaucoma     Hypothyroidism      Past Surgical History:   Procedure Laterality Date    COLONOSCOPY      LIVER SURGERY      RIGHT COLON RESECTION      chemo    SINUS SURGERY       Social History   Social History     Substance and Sexual Activity   Alcohol Use Yes    Comment: social     Social History     Substance and Sexual Activity   Drug Use Never     Social History     Tobacco Use   Smoking Status Former    Types: Cigarettes    Passive exposure: Current   Smokeless Tobacco Never   Tobacco Comments    Quit \"August 2023\".     Family History   Problem Relation Age of Onset    Heart disease Mother     Heart attack Mother     Hypertension Mother     Hyperlipidemia Mother     Diabetes Mother     Cancer Father         bladder    Hypertension Father     Hyperlipidemia Father     Heart attack Father     Heart disease Father     Diabetes Father     Prostate cancer Father     Heart disease Maternal Grandmother        Meds/Allergies     Not in a hospital admission.    Allergies   Allergen Reactions    Cetirizine GI Intolerance     Abdominal pain    Clarithromycin      Other reaction(s): GI Upset    Iodinated Contrast Media     Iodine Strong Hives     welts post iohexal/diatrozole    Morphine      Other reaction(s): GI Upset    Other Hives    Prednisone Nausea Only     took two doses 1/13/08 & 1/14/08 prep for CT scan c/o chest pressure, upper back pain & nausea       Objective     Blood pressure 117/62, pulse 70, temperature (!) 97.2 °F (36.2 °C), " "temperature source Temporal, resp. rate 16, height 5' 4\" (1.626 m), weight 66.7 kg (147 lb), SpO2 98%.      PHYSICAL EXAM    Gen: NAD  CV: RRR  CHEST: Clear  ABD: soft, NT/ND  EXT: no edema      ASSESSMENT/PLAN:   Deb Flowers is a 54 y.o. year old female who presents for colonoscopy due to history of colon polyps and history of colon cancer. The patient is stable and optimized for the procedure, we reviewed risk and benefits. Risk include but not limited to infection, bleeding, perforation and missing a lesion.          "

## 2025-03-26 NOTE — DISCHARGE INSTRUCTIONS
You had a metal clip applied to a polyp removal site.  This clip will fall off on its own and pass in your stool within the next few weeks.  If you are scheduled to have an MRI within the next month, please inform the medical staff of this clip and show them the paper we have provided.

## 2025-03-27 DIAGNOSIS — H91.93 BILATERAL HEARING LOSS, UNSPECIFIED HEARING LOSS TYPE: Primary | ICD-10-CM

## 2025-03-31 PROCEDURE — 88305 TISSUE EXAM BY PATHOLOGIST: CPT | Performed by: STUDENT IN AN ORGANIZED HEALTH CARE EDUCATION/TRAINING PROGRAM

## 2025-04-01 ENCOUNTER — RESULTS FOLLOW-UP (OUTPATIENT)
Age: 55
End: 2025-04-01

## 2025-04-11 ENCOUNTER — ANNUAL EXAM (OUTPATIENT)
Dept: OBGYN CLINIC | Facility: CLINIC | Age: 55
End: 2025-04-11
Payer: COMMERCIAL

## 2025-04-11 VITALS
SYSTOLIC BLOOD PRESSURE: 110 MMHG | DIASTOLIC BLOOD PRESSURE: 60 MMHG | HEIGHT: 64 IN | BODY MASS INDEX: 25.27 KG/M2 | WEIGHT: 148 LBS

## 2025-04-11 DIAGNOSIS — Z85.038 HISTORY OF COLON CANCER: ICD-10-CM

## 2025-04-11 DIAGNOSIS — Z01.419 ENCOUNTER FOR GYNECOLOGICAL EXAMINATION WITHOUT ABNORMAL FINDING: Primary | ICD-10-CM

## 2025-04-11 PROCEDURE — 99396 PREV VISIT EST AGE 40-64: CPT | Performed by: ADVANCED PRACTICE MIDWIFE

## 2025-04-11 NOTE — PROGRESS NOTES
Name: Deb Flowers      : 1970      MRN: [id number]  Encounter Provider: Kathleen Crawford CNM  Encounter Date: 2025   Encounter department: [facility] [place]  :  Assessment & Plan  Encounter for gynecological examination without abnormal finding  - Routine well woman exam completed today.  - Cervical Cancer Screening: Current ASCCP Guidelines reviewed. Last Pap: 2024 . Next Pap Due:   - HPV Vaccination status: Not immunized  - STI screening offered including HIV: not indicated based on hx or requested at time of visit  - Breast Cancer Screening: Last Mammogram Not on file, printed for patient to schedule, ordered by PCP  - Colorectal cancer screening was not ordered.  - The following were reviewed in today's visit: breast self exam, menopause, adequate intake of calcium and vitamin D, and exercise   - RTO 1 yr       History of colon cancer  Colonoscopy 1 yr follow-up           History of Present Illness   Deb presents today for gyn exam. No vaginal bleeding since onset of menopause. 2024 last pap smear- normal, Hx of abnormal pap smear - no. Sexually active- occasional, with partner for 37 yrs. Does not desire STI testing.  Unknown  mammogram- possibly , and 2024 colonoscopy- repeat 1 yr. Reports interrupted hrs of sleep daily, Occasional servings of calcium rich foods daily. Exercises: active daily. 1-2 servings of caffeine daily. Breast changes: none. Safe at home- yes.  Concerns: notes increase in weight and predominately in midsection. Reviewed diet and changes to try lean protein, lower carb intake, increase water intake    Deb Flowers is a 54 y.o. female who presents for gyn exam  History obtained from: patient    Review of Systems   Constitutional:  Positive for unexpected weight change. Negative for chills, fatigue and fever.   Respiratory:  Negative for cough and shortness of breath.    Cardiovascular:  Negative for chest pain, palpitations and leg  swelling.   Gastrointestinal:  Negative for abdominal pain, constipation and diarrhea.        No change in bowel pattern   Genitourinary:  Positive for vaginal discharge. Negative for difficulty urinating, dysuria, frequency, pelvic pain, urgency, vaginal bleeding and vaginal pain.   Neurological:  Negative for dizziness and headaches.   Psychiatric/Behavioral:  Negative for self-injury. The patient is not nervous/anxious.      Medical History Reviewed by provider this encounter:     .  Current Outpatient Medications on File Prior to Visit   Medication Sig Dispense Refill    BIMATOPROST OP Apply 1 drop to eye daily      cyclobenzaprine (FLEXERIL) 5 mg tablet Take 1 tablet (5 mg total) by mouth 3 (three) times a day as needed for muscle spasms 30 tablet 0    Ergocalciferol (VITAMIN D2 PO) Take 50,000 Units by mouth once a week      ezetimibe (ZETIA) 10 mg tablet Take 1 tablet (10 mg total) by mouth daily 90 tablet 1    latanoprost (XALATAN) 0.005 % ophthalmic solution Administer 1 drop to both eyes daily at bedtime      levothyroxine (Euthyrox) 50 mcg tablet Take 1 tablet (50 mcg total) by mouth daily in the early morning 90 tablet 1    Multiple Vitamins-Minerals (CENTRUM ADULTS PO) Take 1 tablet by mouth in the morning      naproxen sodium (ALEVE) 220 MG tablet Take 220 mg by mouth every 12 (twelve) hours as needed      NON FORMULARY DARCI botanical extract blend      bisacodyl (DULCOLAX) 5 mg EC tablet Take 4 tablets (20 mg total) by mouth once for 1 dose Colonoscopy prep 4 tablet 0    polyethylene glycol (MIRALAX) 17 g packet Take 238 g by mouth once for 1 dose For bowel prep. Mix in 64 oz of gatorade. Drink 32 oz at the rate of 8 oz/1 glass every 15 mins starting at 5 pm on the evening prior to day of procedure. Drink the remaining 32 oz 5 hours prior to procedure time at at the rate of 8 oz/1 glass every 15 mins until finished. 238 g 0    [DISCONTINUED] predniSONE 10 mg tablet Take 5 tabs po x 2 days; 4 tabs po x  "2 days; 3 tabs po x 1 day; 2 tabs po x 1 day. 1 tab po x 1 day. 24 tablet 0    [DISCONTINUED] timolol (TIMOPTIC) 0.5 % ophthalmic solution Administer 1 drop to both eyes daily       No current facility-administered medications on file prior to visit.      Social History     Tobacco Use    Smoking status: Former     Types: Cigarettes     Passive exposure: Current    Smokeless tobacco: Never    Tobacco comments:     Quit \"August 2023\".   Vaping Use    Vaping status: Never Used   Substance and Sexual Activity    Alcohol use: Yes     Comment: social    Drug use: Never    Sexual activity: Yes     Partners: Male     Birth control/protection: Post-menopausal        Objective   LMP  (LMP Unknown) Comment: 06/2023     Physical Exam  Vitals reviewed.   Constitutional:       Appearance: Normal appearance.   Neck:      Thyroid: No thyroid mass or thyroid tenderness.   Cardiovascular:      Rate and Rhythm: Normal rate.   Pulmonary:      Effort: Pulmonary effort is normal.   Chest:   Breasts:     Right: No mass, nipple discharge, skin change or tenderness.      Left: No mass, nipple discharge, skin change or tenderness.   Abdominal:      Tenderness: There is no abdominal tenderness. There is no guarding or rebound.   Genitourinary:     General: Normal vulva.      Exam position: Lithotomy position.      Labia:         Right: No rash, tenderness or lesion.         Left: No rash, tenderness or lesion.       Urethra: No urethral pain, urethral swelling or urethral lesion.      Vagina: No vaginal discharge, erythema, tenderness, bleeding or lesions.      Cervix: No cervical motion tenderness, discharge, friability, lesion or erythema.      Uterus: Normal. Not enlarged and not tender.       Adnexa:         Right: No mass, tenderness or fullness.          Left: No mass, tenderness or fullness.     Lymphadenopathy:      Upper Body:      Right upper body: No axillary adenopathy.      Left upper body: No axillary adenopathy. "   Neurological:      Mental Status: She is alert and oriented to person, place, and time.   Psychiatric:         Mood and Affect: Mood normal.         Behavior: Behavior normal.

## 2025-05-31 ENCOUNTER — APPOINTMENT (OUTPATIENT)
Dept: RADIOLOGY | Facility: CLINIC | Age: 55
End: 2025-05-31
Payer: COMMERCIAL

## 2025-05-31 ENCOUNTER — OFFICE VISIT (OUTPATIENT)
Dept: URGENT CARE | Facility: CLINIC | Age: 55
End: 2025-05-31
Payer: COMMERCIAL

## 2025-05-31 VITALS
TEMPERATURE: 98.1 F | OXYGEN SATURATION: 98 % | WEIGHT: 147.38 LBS | HEART RATE: 81 BPM | BODY MASS INDEX: 25.3 KG/M2 | DIASTOLIC BLOOD PRESSURE: 64 MMHG | RESPIRATION RATE: 16 BRPM | SYSTOLIC BLOOD PRESSURE: 110 MMHG

## 2025-05-31 DIAGNOSIS — M77.8 LEFT SHOULDER TENDONITIS: ICD-10-CM

## 2025-05-31 DIAGNOSIS — L05.91 PILONIDAL CYST: Primary | Chronic | ICD-10-CM

## 2025-05-31 DIAGNOSIS — M77.8 LEFT SHOULDER TENDONITIS: Primary | ICD-10-CM

## 2025-05-31 PROCEDURE — 73030 X-RAY EXAM OF SHOULDER: CPT

## 2025-05-31 PROCEDURE — 99213 OFFICE O/P EST LOW 20 MIN: CPT

## 2025-05-31 PROCEDURE — S9088 SERVICES PROVIDED IN URGENT: HCPCS

## 2025-05-31 RX ORDER — CYCLOBENZAPRINE HCL 10 MG
5 TABLET ORAL 3 TIMES DAILY PRN
Qty: 30 TABLET | Refills: 0 | Status: SHIPPED | OUTPATIENT
Start: 2025-05-31

## 2025-05-31 RX ORDER — NAPROXEN SODIUM 220 MG/1
220 TABLET, FILM COATED ORAL 2 TIMES DAILY WITH MEALS
Qty: 30 TABLET | Refills: 0 | Status: SHIPPED | OUTPATIENT
Start: 2025-05-31

## 2025-05-31 NOTE — PATIENT INSTRUCTIONS
Continue supportive care with naproxen and Tylenol as needed using topical medications.  Symptoms continue past 2 to 4 weeks follow-up with orthopedics is recommended.  Go to emergency room (ER) if you are getting worse.

## 2025-05-31 NOTE — PROGRESS NOTES
[facility]    NAME: Deb Flowers is a 54 y.o. female  : 1970    MRN: [id number]  DATE: May 31, 2025  TIME: 2:44 PM    Assessment and Plan   Left shoulder tendonitis [M77.8]  1. Left shoulder tendonitis  XR shoulder 2+ vw left        Suspected acute on chronic tendinitis of the left shoulder.  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  Suspect with holding grandchild and recent box moving that she has exacerbation of this.  Overall range of motion is maintained though painful continue supportive care with naproxen and Tylenol as needed using topical medications.  Symptoms continue past 2 to 4 weeks follow-up with orthopedics is recommended.  Go to ER if symptoms get worse.     Patient Instructions     Continue supportive care with naproxen and Tylenol as needed using topical medications.  Symptoms continue past 2 to 4 weeks follow-up with orthopedics is recommended.  Go to emergency room (ER) if you are getting worse.     Chief Complaint     Chief Complaint   Patient presents with    Arm Pain     Upper arm pain for a few months. States she has h/o pinched nerve in shoulder. Took naproxen. Pain was severe after holding 9 month old last night. No fall injury or trauma. Pt does a lot of lifting and cleaning. Pain is worse with movement, ROM is compromised.          History of Present Illness       Presents with left thumb pain for last few months.  Reports that she does have a history of a pinched to the shoulder.  Present constant for several months but mild beginning to get worse today.  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  She tried lidocaine patches naproxen and Flexeril and Tylenol without relief.  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  Majority of pain is in the paraspinal left neck area through the shoulder all  stopping for the elbow area.  There is no numbness or tingling of the upper arm area.  She does not feel loss of  strength.        Review of Systems   Review of Systems   Constitutional:  Negative for fever.   Respiratory:  Negative for shortness of breath.    Cardiovascular:  Negative for chest pain.   Musculoskeletal:  Positive for myalgias.   Skin:  Negative for color change and wound.   Neurological:  Positive for numbness. Negative for weakness.         Current Medications     Current Medications[1]    Current Allergies     Allergies as of 05/31/2025 - Reviewed 05/31/2025   Allergen Reaction Noted    Cetirizine GI Intolerance 05/17/2021    Clarithromycin  11/26/2013    Iodinated contrast media  03/22/2019    Iodine strong Hives 11/19/2007    Morphine  11/26/2013    Other Hives 11/26/2013    Prednisone Nausea Only 01/14/2008            The following portions of the patient's history were reviewed and updated as appropriate: allergies, current medications, past family history, past medical history, past social history, past surgical history and problem list.     Past Medical History[2]    Past Surgical History[3]    Family History[4]      Medications have been verified.        Objective   /64   Pulse 81   Temp 98.1 °F (36.7 °C)   Resp 16   Wt 66.8 kg (147 lb 6 oz)   LMP  (LMP Unknown) Comment: 06/2023  SpO2 98%   BMI 25.30 kg/m²        Physical Exam     Physical Exam  Vitals reviewed.   Constitutional:       Appearance: Normal appearance.     Cardiovascular:      Rate and Rhythm: Normal rate and regular rhythm.      Pulses: Normal pulses.   Pulmonary:      Effort: Pulmonary effort is normal. No respiratory distress.     Musculoskeletal:      Right shoulder: No swelling, deformity, tenderness or bony tenderness. Normal range of motion. Normal strength. Normal pulse.      Left shoulder: Tenderness present. No swelling, deformity or bony tenderness. Normal range of motion. Normal strength. Normal  pulse.      Right upper arm: Normal.      Left upper arm: Normal.      Right elbow: Normal.      Left elbow: Normal.      Right forearm: Normal.      Left forearm: Normal.      Left wrist: Normal. Normal pulse.      Left hand: Normal strength. Normal sensation. Normal pulse.      Cervical back: Normal. No tenderness. No pain with movement.      Thoracic back: Normal.      Comments: Negative empty cans test, Neer's test, Goddard test or posterior liftoff test internal/external rotation.  Of the shoulder she did have tenderness at the extremes of each of these motions but was able to do.  Full range of motion of the elbow and wrist area.  No tenderness over the collarbone or scapular area.     Skin:     General: Skin is warm and dry.      Capillary Refill: Capillary refill takes less than 2 seconds.     Neurological:      General: No focal deficit present.      Mental Status: She is alert and oriented to person, place, and time.     Psychiatric:         Mood and Affect: Mood normal.         Behavior: Behavior normal.                         [1]   Current Outpatient Medications:     BIMATOPROST OP, Apply 1 drop to eye in the morning., Disp: , Rfl:     cyclobenzaprine (FLEXERIL) 5 mg tablet, Take 1 tablet (5 mg total) by mouth 3 (three) times a day as needed for muscle spasms, Disp: 30 tablet, Rfl: 0    Ergocalciferol (VITAMIN D2 PO), Take 50,000 Units by mouth once a week, Disp: , Rfl:     ezetimibe (ZETIA) 10 mg tablet, Take 1 tablet (10 mg total) by mouth daily, Disp: 90 tablet, Rfl: 1    latanoprost (XALATAN) 0.005 % ophthalmic solution, Administer 1 drop to both eyes daily at bedtime, Disp: , Rfl:     levothyroxine (Euthyrox) 50 mcg tablet, Take 1 tablet (50 mcg total) by mouth daily in the early morning, Disp: 90 tablet, Rfl: 1    Multiple Vitamins-Minerals (CENTRUM ADULTS PO), Take 1 tablet by mouth in the morning, Disp: , Rfl:     naproxen sodium (ALEVE) 220 MG tablet, Take 220 mg by mouth every 12 (twelve)  hours as needed, Disp: , Rfl:     NON FORMULARY, DARCI botanical extract blend, Disp: , Rfl:     bisacodyl (DULCOLAX) 5 mg EC tablet, Take 4 tablets (20 mg total) by mouth once for 1 dose Colonoscopy prep, Disp: 4 tablet, Rfl: 0    polyethylene glycol (MIRALAX) 17 g packet, Take 238 g by mouth once for 1 dose For bowel prep. Mix in 64 oz of gatorade. Drink 32 oz at the rate of 8 oz/1 glass every 15 mins starting at 5 pm on the evening prior to day of procedure. Drink the remaining 32 oz 5 hours prior to procedure time at at the rate of 8 oz/1 glass every 15 mins until finished., Disp: 238 g, Rfl: 0  [2]   Past Medical History:  Diagnosis Date    Cancer (HCC) 2005    Colon CA stage 4    Colon cancer (HCC)     Disease of thyroid gland     Glaucoma     Hypothyroidism    [3]   Past Surgical History:  Procedure Laterality Date    COLONOSCOPY      LIVER SURGERY      RIGHT COLON RESECTION      chemo    SINUS SURGERY     [4]   Family History  Problem Relation Name Age of Onset    Heart disease Mother      Heart attack Mother      Hypertension Mother      Hyperlipidemia Mother      Diabetes Mother      Cancer Father          bladder    Hypertension Father      Hyperlipidemia Father      Heart attack Father      Heart disease Father      Diabetes Father      Prostate cancer Father      Heart disease Maternal Grandmother

## 2025-06-01 RX ORDER — NAPROXEN SODIUM 220 MG/1
220 TABLET, FILM COATED ORAL 2 TIMES DAILY WITH MEALS
Qty: 30 TABLET | Refills: 0 | OUTPATIENT
Start: 2025-06-01

## 2025-06-02 RX ORDER — NAPROXEN SODIUM 220 MG/1
220 TABLET, FILM COATED ORAL EVERY 12 HOURS PRN
Qty: 180 TABLET | Refills: 0 | Status: SHIPPED | OUTPATIENT
Start: 2025-06-02

## 2025-06-09 ENCOUNTER — OFFICE VISIT (OUTPATIENT)
Dept: FAMILY MEDICINE CLINIC | Facility: CLINIC | Age: 55
End: 2025-06-09
Payer: COMMERCIAL

## 2025-06-09 VITALS
TEMPERATURE: 98 F | WEIGHT: 149 LBS | OXYGEN SATURATION: 98 % | DIASTOLIC BLOOD PRESSURE: 66 MMHG | HEART RATE: 71 BPM | HEIGHT: 64 IN | BODY MASS INDEX: 25.44 KG/M2 | SYSTOLIC BLOOD PRESSURE: 108 MMHG | RESPIRATION RATE: 16 BRPM

## 2025-06-09 DIAGNOSIS — J32.4 CHRONIC PANSINUSITIS: Primary | Chronic | ICD-10-CM

## 2025-06-09 PROCEDURE — 99213 OFFICE O/P EST LOW 20 MIN: CPT | Performed by: NURSE PRACTITIONER

## 2025-06-09 RX ORDER — METHYLPREDNISOLONE 4 MG/1
TABLET ORAL
Qty: 21 EACH | Refills: 0 | Status: SHIPPED | OUTPATIENT
Start: 2025-06-09

## 2025-06-09 RX ORDER — GUAIFENESIN, PSEUDOEPHEDRINE HYDROCHLORIDE 600; 60 MG/1; MG/1
1 TABLET, EXTENDED RELEASE ORAL EVERY 12 HOURS
Qty: 60 TABLET | Refills: 0 | Status: SHIPPED | OUTPATIENT
Start: 2025-06-09

## 2025-06-09 RX ORDER — FLUCONAZOLE 100 MG/1
100 TABLET ORAL DAILY
Qty: 14 TABLET | Refills: 0 | Status: SHIPPED | OUTPATIENT
Start: 2025-06-09 | End: 2025-06-23

## 2025-06-09 NOTE — PROGRESS NOTES
"Name: Deb Flowers      : 1970      MRN: [id number]  Encounter Provider: ADAIR Reis  Encounter Date: 2025   Encounter department: [facility]  :  Assessment & Plan  Chronic pansinusitis  Continued issues  Has small grandchildren at home  Has an immunocompromised   Sinus congestion worse in the morning    Orders:    amoxicillin-clavulanate (AUGMENTIN) 875-125 mg per tablet; Take 1 tablet by mouth every 12 (twelve) hours for 14 days    fluconazole (DIFLUCAN) 100 mg tablet; Take 1 tablet (100 mg total) by mouth daily for 14 days    pseudoephedrine-guaifenesin (MUCINEX D)  MG per tablet; Take 1 tablet by mouth every 12 (twelve) hours    methylPREDNISolone 4 MG tablet therapy pack; Use as directed on package           History of Present Illness   The patient is here today to discuss her sinus congestion.   I reviewed their medical conditions, medications, laboratory and radiology studies.  I reviewed their scheduled future lab studies with the patient.     The patient voiced their understanding and agreement.   Follow up  as scheduled .  Please continue to the PORCH section of the note for details of today's visit.             Review of Systems   HENT:  Positive for congestion, postnasal drip, sinus pressure and sinus pain.        Objective   /66 (BP Location: Left arm, Patient Position: Sitting, Cuff Size: Standard)   Pulse 71   Temp 98 °F (36.7 °C) (Tympanic)   Resp 16   Ht 5' 4\" (1.626 m)   Wt 67.6 kg (149 lb)   LMP  (LMP Unknown) Comment: 2023  SpO2 98%   BMI 25.58 kg/m²      Physical Exam  HENT:      Head:      Comments: Sinus congestion     Right Ear: No swelling or tenderness. Tympanic membrane is not erythematous or bulging.      Left Ear: Tympanic membrane normal. No swelling or tenderness. Tympanic membrane is not erythematous or bulging.      Mouth/Throat:      Pharynx: Posterior oropharyngeal erythema and postnasal drip present. "       Administrative Statements   I have spent a total time of 20 minutes in caring for this patient on the day of the visit/encounter including Diagnostic results, Prognosis, Risks and benefits of tx options, Instructions for management, Patient and family education, Importance of tx compliance, Risk factor reductions, Impressions, Counseling / Coordination of care, Documenting in the medical record, Reviewing/placing orders in the medical record (including tests, medications, and/or procedures), and Obtaining or reviewing history  .

## 2025-06-09 NOTE — ASSESSMENT & PLAN NOTE
Continued issues  Has small grandchildren at home  Has an immunocompromised   Sinus congestion worse in the morning    Orders:    amoxicillin-clavulanate (AUGMENTIN) 875-125 mg per tablet; Take 1 tablet by mouth every 12 (twelve) hours for 14 days    fluconazole (DIFLUCAN) 100 mg tablet; Take 1 tablet (100 mg total) by mouth daily for 14 days    pseudoephedrine-guaifenesin (MUCINEX D)  MG per tablet; Take 1 tablet by mouth every 12 (twelve) hours    methylPREDNISolone 4 MG tablet therapy pack; Use as directed on package

## 2025-07-29 DIAGNOSIS — E78.2 MIXED HYPERLIPIDEMIA: Chronic | ICD-10-CM

## 2025-07-30 RX ORDER — EZETIMIBE 10 MG/1
10 TABLET ORAL DAILY
Qty: 90 TABLET | Refills: 1 | Status: SHIPPED | OUTPATIENT
Start: 2025-07-30

## 2025-08-18 ENCOUNTER — VBI (OUTPATIENT)
Dept: ADMINISTRATIVE | Facility: OTHER | Age: 55
End: 2025-08-18